# Patient Record
Sex: FEMALE | Race: WHITE | NOT HISPANIC OR LATINO | Employment: FULL TIME | URBAN - NONMETROPOLITAN AREA
[De-identification: names, ages, dates, MRNs, and addresses within clinical notes are randomized per-mention and may not be internally consistent; named-entity substitution may affect disease eponyms.]

---

## 2023-02-03 PROBLEM — E66.2 CLASS 1 OBESITY WITH ALVEOLAR HYPOVENTILATION AND BODY MASS INDEX (BMI) OF 33.0 TO 33.9 IN ADULT (MULTI): Status: ACTIVE | Noted: 2023-02-03

## 2023-02-03 PROBLEM — E66.09 CLASS 1 OBESITY DUE TO EXCESS CALORIES WITH BODY MASS INDEX (BMI) OF 30.0 TO 30.9 IN ADULT: Status: ACTIVE | Noted: 2023-02-03

## 2023-02-03 PROBLEM — N92.0 HEAVY PERIODS: Status: ACTIVE | Noted: 2023-02-03

## 2023-02-03 PROBLEM — N93.9 ABNORMAL UTERINE BLEEDING (AUB): Status: ACTIVE | Noted: 2023-02-03

## 2023-02-03 PROBLEM — E66.811 CLASS 1 OBESITY DUE TO EXCESS CALORIES WITH BODY MASS INDEX (BMI) OF 30.0 TO 30.9 IN ADULT: Status: ACTIVE | Noted: 2023-02-03

## 2023-02-03 PROBLEM — M54.10 BACK PAIN WITH RADICULOPATHY: Status: ACTIVE | Noted: 2023-02-03

## 2023-02-03 PROBLEM — E66.811 CLASS 1 OBESITY WITH ALVEOLAR HYPOVENTILATION AND BODY MASS INDEX (BMI) OF 33.0 TO 33.9 IN ADULT: Status: ACTIVE | Noted: 2023-02-03

## 2023-02-03 PROBLEM — R73.03 PREDIABETES: Status: ACTIVE | Noted: 2023-02-03

## 2023-02-03 RX ORDER — KETOTIFEN FUMARATE 0.35 MG/ML
1 SOLUTION/ DROPS OPHTHALMIC EVERY 12 HOURS PRN
COMMUNITY
Start: 2022-05-06 | End: 2023-07-17 | Stop reason: SDUPTHER

## 2023-02-03 RX ORDER — MOMETASONE FUROATE 50 UG/1
2 SPRAY, METERED NASAL DAILY
COMMUNITY
Start: 2022-04-29

## 2023-02-03 RX ORDER — FLUTICASONE PROPIONATE 50 MCG
1 SPRAY, SUSPENSION (ML) NASAL DAILY
COMMUNITY
Start: 2022-08-01 | End: 2023-03-17 | Stop reason: ALTCHOICE

## 2023-02-03 RX ORDER — CYCLOBENZAPRINE HCL 10 MG
1 TABLET ORAL 3 TIMES DAILY
COMMUNITY
Start: 2022-10-20 | End: 2023-03-17 | Stop reason: ALTCHOICE

## 2023-02-03 RX ORDER — CETIRIZINE HYDROCHLORIDE 10 MG/1
1 TABLET ORAL NIGHTLY
COMMUNITY
Start: 2022-04-29 | End: 2023-07-17 | Stop reason: SDUPTHER

## 2023-03-15 NOTE — PROGRESS NOTES
Subjective   Patient ID: Margarita Ortega is a 42 y.o. female who presents for Follow-up (4 months; numbness/tingling in fingers and arms, typically at night, right middle and ring finger are always tingling; ).  HPI  A 42 year White F presenting for followup:     Chest pain: Still having some occasional chest pain but it is not bothering her.      Back pain: Still with pain in the left side of her lower back off and on, gets it with standing, housework. Not radiating into the legs anymore. Completed xray. She is taking a supplement for arthritis. She is waking up with back pain, pain is worse in the morning.      Carpal tunnel syndrome: Previously was doing well with bracing. She is having paresthesia (pins and needles sensation) in 3rd and 4th digit of right hand. Was getting it intermittently but for the last few months it has been constant but varying in intensity.      Prediabetes: Not on meds. Weight has been stable.      Dizziness: Likely from vertigo. She has made adjustments in her activities and had significant improvement.      Heavy periods: Initially did great after ablation but her most period was so heavy she had to miss work. She saw Dr. Lowery again and she has followup with him scheduled. Unsure if she wants to pursue hysterectomy.      She has 4 boys. One is in college in [place], 3 are 11, 13, 16. Moved here 2y ago.      All other systems have been reviewed and are negative for complaint  Review of Systems    Objective   Physical Exam  Gen: No acute distress. Alert and oriented x3.   HEENT: Normocephalic, atraumatic. PERRLA and EOMI, no conjunctival injection. B/L EAC are clear, TM's viewed are WNL. No rhinorrhea, no oropharyngeal lesions.  Neck: No lymphadenopathy, thyroid WNL.  CV: Regular rate and rhythm. Normal S1/S2.  Resp: CTAB/L. No wheezes or rhonchi appreciated.  Abdomen: Soft. Nontender. Nondistended. Bowel sounds normoactive. No guarding or rigidity.  Derm: Skin is warm and dry. No rashes  appreciated or suspicious lesions noted.   Neuro: Cranial nerves intact. Normal gait.  Psych: Appropriate mood and affect. Normal speech and eye contact.   Extremities: No deformities appreciated. No severe edema.    Assessment/Plan        43yo female here for followup:     #Chest pain:  likely anxiety  improving     #Back pain with radiculopathy  Xrays show DJD  Did ok with medrol pack, muscle relaxer  Declining PT at this point     #Carpal tunnel syndrome:   Previously doing well with nighttime bracing  Referring to ortho hand to discuss carpal tunnel surgery     #Environmental allergies  on cetirizine  she gets nasacort otc  she takes flonase during winter but it doesn't help as much  she also uses eye drops (pataday)     #Prediabetes:  doing well since weight loss  check labs      #Heavy periods:  established with gyn  s/p D&C ablation, initially did well but now having more pain and bleeding  Completed recent ultrasound and has followup with GYN     HCM:  Mammogram Nov was negative  scheduled for pap with Dr. Lowery  Declining flu vaccine

## 2023-03-17 ENCOUNTER — OFFICE VISIT (OUTPATIENT)
Dept: PRIMARY CARE | Facility: CLINIC | Age: 43
End: 2023-03-17
Payer: COMMERCIAL

## 2023-03-17 VITALS
DIASTOLIC BLOOD PRESSURE: 71 MMHG | HEART RATE: 80 BPM | HEIGHT: 65 IN | WEIGHT: 188 LBS | BODY MASS INDEX: 31.32 KG/M2 | SYSTOLIC BLOOD PRESSURE: 114 MMHG

## 2023-03-17 DIAGNOSIS — R73.03 PREDIABETES: Primary | ICD-10-CM

## 2023-03-17 DIAGNOSIS — G56.03 BILATERAL CARPAL TUNNEL SYNDROME: ICD-10-CM

## 2023-03-17 PROCEDURE — 99214 OFFICE O/P EST MOD 30 MIN: CPT | Performed by: FAMILY MEDICINE

## 2023-03-17 PROCEDURE — 1036F TOBACCO NON-USER: CPT | Performed by: FAMILY MEDICINE

## 2023-03-17 RX ORDER — FEXOFENADINE HCL 60 MG
60 TABLET ORAL DAILY
COMMUNITY
End: 2023-12-20 | Stop reason: ALTCHOICE

## 2023-03-17 NOTE — PATIENT INSTRUCTIONS
Orthopedics:   Kirk Agosto () [phone]  [facility] [phone]

## 2023-05-09 LAB — SARS-COV-2 RESULT: NOT DETECTED

## 2023-05-11 ENCOUNTER — HOSPITAL ENCOUNTER (OUTPATIENT)
Dept: DATA CONVERSION | Facility: HOSPITAL | Age: 43
End: 2023-05-12
Attending: OBSTETRICS & GYNECOLOGY | Admitting: OBSTETRICS & GYNECOLOGY
Payer: COMMERCIAL

## 2023-05-11 DIAGNOSIS — E66.9 OBESITY, UNSPECIFIED: ICD-10-CM

## 2023-05-11 DIAGNOSIS — R10.2 PELVIC AND PERINEAL PAIN: ICD-10-CM

## 2023-05-11 DIAGNOSIS — N93.9 ABNORMAL UTERINE AND VAGINAL BLEEDING, UNSPECIFIED: ICD-10-CM

## 2023-05-11 DIAGNOSIS — N80.03 ADENOMYOSIS OF THE UTERUS: ICD-10-CM

## 2023-05-11 DIAGNOSIS — N72 INFLAMMATORY DISEASE OF CERVIX UTERI: ICD-10-CM

## 2023-05-11 DIAGNOSIS — D25.9 LEIOMYOMA OF UTERUS, UNSPECIFIED: ICD-10-CM

## 2023-05-11 DIAGNOSIS — R73.03 PREDIABETES: ICD-10-CM

## 2023-05-11 LAB
ANION GAP IN SER/PLAS: 14 MMOL/L (ref 10–20)
CALCIUM (MG/DL) IN SER/PLAS: 8.3 MG/DL (ref 8.6–10.3)
CARBON DIOXIDE, TOTAL (MMOL/L) IN SER/PLAS: 20 MMOL/L (ref 21–32)
CHLORIDE (MMOL/L) IN SER/PLAS: 106 MMOL/L (ref 98–107)
CREATININE (MG/DL) IN SER/PLAS: 0.5 MG/DL (ref 0.5–1.05)
ERYTHROCYTE DISTRIBUTION WIDTH (RATIO) BY AUTOMATED COUNT: 12.4 % (ref 11.5–14.5)
ERYTHROCYTE MEAN CORPUSCULAR HEMOGLOBIN CONCENTRATION (G/DL) BY AUTOMATED: 32.4 G/DL (ref 32–36)
ERYTHROCYTE MEAN CORPUSCULAR VOLUME (FL) BY AUTOMATED COUNT: 91 FL (ref 80–100)
ERYTHROCYTES (10*6/UL) IN BLOOD BY AUTOMATED COUNT: 4.11 X10E12/L (ref 4–5.2)
GFR FEMALE: >90 ML/MIN/1.73M2
GLUCOSE (MG/DL) IN SER/PLAS: 121 MG/DL (ref 74–99)
HCG, URINE: NEGATIVE
HEMATOCRIT (%) IN BLOOD BY AUTOMATED COUNT: 37.4 % (ref 36–46)
HEMOGLOBIN (G/DL) IN BLOOD: 12.1 G/DL (ref 12–16)
LEUKOCYTES (10*3/UL) IN BLOOD BY AUTOMATED COUNT: 9.6 X10E9/L (ref 4.4–11.3)
PLATELETS (10*3/UL) IN BLOOD AUTOMATED COUNT: 119 X10E9/L (ref 150–450)
POTASSIUM (MMOL/L) IN SER/PLAS: 3.8 MMOL/L (ref 3.5–5.3)
SODIUM (MMOL/L) IN SER/PLAS: 136 MMOL/L (ref 136–145)
UREA NITROGEN (MG/DL) IN SER/PLAS: 17 MG/DL (ref 6–23)

## 2023-06-12 LAB
COMPLETE PATHOLOGY REPORT: NORMAL
CONVERTED CLINICAL DIAGNOSIS-HISTORY: NORMAL
CONVERTED FINAL DIAGNOSIS: NORMAL
CONVERTED FINAL REPORT PDF LINK TO COPY AND PASTE: NORMAL
CONVERTED GROSS DESCRIPTION: NORMAL

## 2023-07-17 DIAGNOSIS — R09.82 PND (POST-NASAL DRIP): ICD-10-CM

## 2023-07-17 DIAGNOSIS — H57.9 ITCHY, WATERY, AND RED EYE: ICD-10-CM

## 2023-07-17 PROBLEM — D25.9 UTERINE FIBROID: Status: ACTIVE | Noted: 2023-07-17

## 2023-07-17 PROBLEM — R10.2 PELVIC PAIN IN FEMALE: Status: ACTIVE | Noted: 2023-07-17

## 2023-07-17 RX ORDER — FLUTICASONE PROPIONATE 50 MCG
1 SPRAY, SUSPENSION (ML) NASAL DAILY
Qty: 16 G | Refills: 0 | Status: SHIPPED | OUTPATIENT
Start: 2023-07-17 | End: 2023-07-17 | Stop reason: SDUPTHER

## 2023-07-17 RX ORDER — CETIRIZINE HYDROCHLORIDE 10 MG/1
10 TABLET ORAL NIGHTLY
Qty: 90 TABLET | Refills: 0 | Status: SHIPPED | OUTPATIENT
Start: 2023-07-17 | End: 2023-07-17 | Stop reason: SDUPTHER

## 2023-07-17 RX ORDER — FLUTICASONE PROPIONATE 50 MCG
1 SPRAY, SUSPENSION (ML) NASAL DAILY
COMMUNITY
Start: 2022-08-01 | End: 2023-07-17 | Stop reason: SDUPTHER

## 2023-07-17 RX ORDER — KETOTIFEN FUMARATE 0.35 MG/ML
1 SOLUTION/ DROPS OPHTHALMIC EVERY 12 HOURS PRN
Qty: 4 ML | Refills: 0 | Status: SHIPPED | OUTPATIENT
Start: 2023-07-17 | End: 2023-08-29

## 2023-07-17 RX ORDER — FLUTICASONE PROPIONATE 50 MCG
1 SPRAY, SUSPENSION (ML) NASAL DAILY
Qty: 16 G | Refills: 0 | Status: SHIPPED | OUTPATIENT
Start: 2023-07-17 | End: 2023-09-29 | Stop reason: ALTCHOICE

## 2023-07-17 RX ORDER — CETIRIZINE HYDROCHLORIDE 10 MG/1
10 TABLET ORAL NIGHTLY
Qty: 90 TABLET | Refills: 0 | Status: SHIPPED | OUTPATIENT
Start: 2023-07-17

## 2023-08-29 DIAGNOSIS — H57.9 ITCHY, WATERY, AND RED EYE: ICD-10-CM

## 2023-08-29 RX ORDER — MINOXIDIL 50 MG/G
AEROSOL, FOAM TOPICAL
Qty: 5 ML | Refills: 0 | Status: SHIPPED | OUTPATIENT
Start: 2023-08-29 | End: 2024-05-30 | Stop reason: SDUPTHER

## 2023-09-07 VITALS — HEIGHT: 65 IN | WEIGHT: 187.39 LBS | BODY MASS INDEX: 31.22 KG/M2

## 2023-09-14 NOTE — H&P
"    History & Physical Reviewed:   Pregnant/Lactating:  ·  Are You Pregnant no (1)   ·  Are You Currently Breastfeeding no (1)     I have reviewed the History and Physical dated:  05-May-2023   History and Physical reviewed and relevant findings noted. Patient examined to review pertinent physical  findings.: No significant changes   Home Medications Reviewed: no changes noted   Allergies Reviewed: no changes noted       ERAS (Enhanced Recovery After Surgery):  ·  ERAS Patient: yes   ·  CPM/PAT Utilization: yes   ·  Immunonutrition Recovery Drink Utilization: no   ·  Carbohydrate Supplement Drink Utilization: no     Consent:   COVID-19 Consent:  ·  COVID-19 Risk Consent Surgeon has reviewed key risks related to the risk of elissa COVID-19 and if they contract COVID-19 what the risks are.       Electronic Signatures:  Claudio Lowery)  (Signed 11-May-2023 08:30)   Authored: History & Physical Reviewed, ERAS, Consent,  Note Completion      Last Updated: 11-May-2023 08:30 by Claudio Lowery)    References:  1.  Data Referenced From \"Patient Profile - Preop v3\" 11-May-2023 07:23   "

## 2023-09-14 NOTE — DISCHARGE SUMMARY
Send Summary:   Discharge Summary Providers:  Provider Role Provider Name   · Attending Claudio Lowery   · Referring Claudio Lowery   · Primary Nicole Sanders       Note Recipients: Claudio Lowery MD Kiefer, Samantha,  - [phone] [Preferred]       Discharge:    Summary:   Admission Date: .11-May-2023 07:01:00   Discharge Date: 12-May-2023   Attending Physician at Discharge: Claudio Lowery   Admission Reason: Fibroid uterus, abnormal uterine  bleeding, pain in female pelvis   Final Discharge Diagnoses: LAVH   Procedures: Date: 11-May-2023 10:30:00  Procedure Name: 1.  Laparoscopic-assisted vaginal hysterectomy, cystoscopy  2.   3.   4.   5.   Condition at Discharge: Satisfactory   Disposition at Discharge: .Home   Vital Signs:        T   P  R  BP   MAP  SpO2   Value  36.5  72  19  114/73      97%  Date/Time 5/12 10:55 5/12 10:55 5/12 10:55 5/12 10:55    5/12 10:55  Range  (34.9C - 36.6C )  (71 - 89 )  (18 - 19 )  (113 - 131 )/ (58 - 82 )    (97% - 98% )    Date:            Weight/Scale Type:  Height:   11-May-2023 12:39  85  kg         165.1  cm    Physical Exam:    PHYSICAL EXAM:  General:  NAD, well-nourished, well-developed  HEENT:  NC/AT, MMM, PERRLA, EOMI  Pulmonary: CTA bilaterally, neck supple, no LAD  Cardiovascular:  RRR, no M/R/G  Abdomen: soft, NT/ND, +BSx4  Extremities: no C/C/E, PPPx4, DAMION x4  Neurological: CN II-XII intact, gait WNL, no focal abnormalities  Skin: dry and warm, no rashes      Hospital Course:    42 year old female s/p LAVH and Cystoscopy on 5-11-23 with Dr. Lowery.  The patient tolerated the procedure well and there were no complications.  We managed her surgical  pain with acetaminophen, IV ketorolac and oxycodone and we encouraged her to ambulate frequently on the nursing unit.  The patient was tolerating a regular diet and ambulating without difficulty therefore she was discharged home with instructions to follow-up  with Dr. Lowery in six weeks.         Discharge  Information:    and Continuing Care:   Lab Results - Pending:    Surgical Pathology Drawn at 11-May-2023 10:10:00  Radiology Results - Pending: None   Discharge Instructions:    Activity:           activity as tolerated.          May shower..            May not return to school/work until follow-up visit with.  Dr. Lowery            May drive..  Beginning in 5-7 days          No pushing, pulling, or lifting objects greater than 10 pounds for 2 week(s).            Walk intermittently throughout the day.          Return to normal activity          No pushing, pulling or lifting objects greater than 10 pounds for 2 weeks    Nutrition/Diet:           regular    Follow Up Appointments:    Follow-Up - GYN Provider:           Physician/Dept/Service:   GYN Provider   Dr. Lowery          Call to Schedule in:   6 weeks          Phone Number:   [phone]    Discharge Medications: Home Medication   cetirizine 10 mg oral tablet - 1 tab(s) orally once a day  docusate sodium 100 mg oral tablet - 2 tab(s) orally once a day  ketotifen ophthalmic - 1 drop(s) to each affected eye 2 times a day  multivitamin - orally once a day  triamcinolone 55 mcg/inh nasal spray - 1 spray(s) nasal once a day  Glucosamine Chondroitin oral capsule - orally once a day     PRN Medication   acetaminophen 500 mg oral tablet - 2 tab(s) orally every 12 hours, As Needed  ibuprofen 200 mg oral tablet - 2 tab(s) orally every 6 hours, As Needed  oxycodone-acetaminophen 5 mg-325 mg oral tablet - 1 tab(s) orally every 6 hours, As Needed   Issues to Discuss at Follow-up / Goals for Continuing Care:    pain  activity        DNR Status:   ·  Code Status Code Status order at time of discharge: Full Code       Electronic Signatures:  Holli Acevedo (PA (PHYSICIAN))  (Signed 12-May-2023 12:50)   Authored: Send Summary, Summary Content, Ongoing Care,  DNR Status, Note Completion      Last Updated: 12-May-2023 12:50 by Holli Acevedo (PA (PHYSICIAN))

## 2023-09-20 NOTE — PROGRESS NOTES
Subjective   Patient ID: Margarita Ortega is a 42 y.o. female who presents for Follow-up (6 months).  HPI     Chest pain: Still having some occasional chest pain. Her son passed away from a drowning this summer. Not interested in med currently, she is scheduled to begin counselling for this soon.      Back pain: Still with pain in the left side of her lower back off and on, gets it with standing, housework. Not radiating into the legs anymore. Completed xray. She is taking a supplement for arthritis. She is waking up with back pain, pain is worse in the morning.      Carpal tunnel syndrome: Previously was doing well with bracing. She is having paresthesia (pins and needles sensation) in 3rd and 4th digit of right hand. Was getting it intermittently but for the last few months it has been constant but varying in intensity.      Prediabetes: Not on meds. Weight has been stable.      Dizziness: Likely from vertigo. She has made adjustments in her activities and had significant improvement.      Heavy periods: s/p hysterectomy in May     She has 4 boys. One is in college in [place], 3 are 11, 13, 16. Moved here 2y ago.      All other systems have been reviewed and are negative for complaint  Review of Systems    Objective   Physical Exam  Gen: No acute distress. Alert and oriented x3.   HEENT: Normocephalic, atraumatic. PERRLA and EOMI, no conjunctival injection. B/L EAC are clear, TM's viewed are WNL. No rhinorrhea, no oropharyngeal lesions.  Neck: No lymphadenopathy, thyroid WNL.  CV: Regular rate and rhythm. Normal S1/S2.  Resp: CTAB/L. No wheezes or rhonchi appreciated.  Abdomen: Soft. Nontender. Nondistended. Bowel sounds normoactive. No guarding or rigidity.  Derm: Skin is warm and dry. No rashes appreciated or suspicious lesions noted.   Neuro: Cranial nerves intact. Normal gait.  Psych: Appropriate mood and affect. Normal speech and eye contact.   Extremities: No deformities appreciated. No severe  edema.    Assessment/Plan        41yo female here for followup:     #Grief  Declining meds  Planning for counselling    #Chest pain:  likely anxiety  Declining meds for now     #Back pain with radiculopathy  Xrays show DJD  Did ok with medrol pack, muscle relaxer  Declining PT at this point     #Carpal tunnel syndrome:   Previously doing well with nighttime bracing  Previously referred to ortho hand to discuss carpal tunnel surgery  Holding off on eval now while symptoms are better     #Environmental allergies  on cetirizine  she gets nasacort otc  she takes flonase during winter but it doesn't help as much  she also uses eye drops (pataday)     #Prediabetes:  doing well since weight loss  check labs      #Heavy periods:  S/p laparoscopic assisted vaginal hysterectomy in May     HCM:  Mammogram Nov was negative  Declining flu vaccine

## 2023-09-25 ENCOUNTER — LAB (OUTPATIENT)
Dept: LAB | Facility: LAB | Age: 43
End: 2023-09-25
Payer: COMMERCIAL

## 2023-09-25 DIAGNOSIS — R73.03 PREDIABETES: ICD-10-CM

## 2023-09-25 LAB
ALANINE AMINOTRANSFERASE (SGPT) (U/L) IN SER/PLAS: 14 U/L (ref 7–45)
ALBUMIN (G/DL) IN SER/PLAS: 4.6 G/DL (ref 3.4–5)
ALKALINE PHOSPHATASE (U/L) IN SER/PLAS: 47 U/L (ref 33–110)
ANION GAP IN SER/PLAS: 12 MMOL/L (ref 10–20)
ASPARTATE AMINOTRANSFERASE (SGOT) (U/L) IN SER/PLAS: 14 U/L (ref 9–39)
BASOPHILS (10*3/UL) IN BLOOD BY AUTOMATED COUNT: 0.03 X10E9/L (ref 0–0.1)
BASOPHILS/100 LEUKOCYTES IN BLOOD BY AUTOMATED COUNT: 0.4 % (ref 0–2)
BILIRUBIN TOTAL (MG/DL) IN SER/PLAS: 0.5 MG/DL (ref 0–1.2)
CALCIUM (MG/DL) IN SER/PLAS: 9.1 MG/DL (ref 8.6–10.3)
CARBON DIOXIDE, TOTAL (MMOL/L) IN SER/PLAS: 27 MMOL/L (ref 21–32)
CHLORIDE (MMOL/L) IN SER/PLAS: 104 MMOL/L (ref 98–107)
CHOLESTEROL (MG/DL) IN SER/PLAS: 159 MG/DL (ref 0–199)
CHOLESTEROL IN HDL (MG/DL) IN SER/PLAS: 40.1 MG/DL
CHOLESTEROL/HDL RATIO: 4
CREATININE (MG/DL) IN SER/PLAS: 0.57 MG/DL (ref 0.5–1.05)
EOSINOPHILS (10*3/UL) IN BLOOD BY AUTOMATED COUNT: 0.01 X10E9/L (ref 0–0.7)
EOSINOPHILS/100 LEUKOCYTES IN BLOOD BY AUTOMATED COUNT: 0.1 % (ref 0–6)
ERYTHROCYTE DISTRIBUTION WIDTH (RATIO) BY AUTOMATED COUNT: 12.8 % (ref 11.5–14.5)
ERYTHROCYTE MEAN CORPUSCULAR HEMOGLOBIN CONCENTRATION (G/DL) BY AUTOMATED: 33.7 G/DL (ref 32–36)
ERYTHROCYTE MEAN CORPUSCULAR VOLUME (FL) BY AUTOMATED COUNT: 86 FL (ref 80–100)
ERYTHROCYTES (10*6/UL) IN BLOOD BY AUTOMATED COUNT: 4.68 X10E12/L (ref 4–5.2)
GFR FEMALE: >90 ML/MIN/1.73M2
GLUCOSE (MG/DL) IN SER/PLAS: 95 MG/DL (ref 74–99)
HEMATOCRIT (%) IN BLOOD BY AUTOMATED COUNT: 40.1 % (ref 36–46)
HEMOGLOBIN (G/DL) IN BLOOD: 13.5 G/DL (ref 12–16)
IMMATURE GRANULOCYTES/100 LEUKOCYTES IN BLOOD BY AUTOMATED COUNT: 0.3 % (ref 0–0.9)
LDL: 89 MG/DL (ref 0–99)
LEUKOCYTES (10*3/UL) IN BLOOD BY AUTOMATED COUNT: 6.7 X10E9/L (ref 4.4–11.3)
LYMPHOCYTES (10*3/UL) IN BLOOD BY AUTOMATED COUNT: 1.66 X10E9/L (ref 1.2–4.8)
LYMPHOCYTES/100 LEUKOCYTES IN BLOOD BY AUTOMATED COUNT: 24.8 % (ref 13–44)
MONOCYTES (10*3/UL) IN BLOOD BY AUTOMATED COUNT: 0.42 X10E9/L (ref 0.1–1)
MONOCYTES/100 LEUKOCYTES IN BLOOD BY AUTOMATED COUNT: 6.3 % (ref 2–10)
NEUTROPHILS (10*3/UL) IN BLOOD BY AUTOMATED COUNT: 4.55 X10E9/L (ref 1.2–7.7)
NEUTROPHILS/100 LEUKOCYTES IN BLOOD BY AUTOMATED COUNT: 68.1 % (ref 40–80)
PLATELETS (10*3/UL) IN BLOOD AUTOMATED COUNT: 174 X10E9/L (ref 150–450)
POTASSIUM (MMOL/L) IN SER/PLAS: 4 MMOL/L (ref 3.5–5.3)
PROTEIN TOTAL: 7.2 G/DL (ref 6.4–8.2)
SODIUM (MMOL/L) IN SER/PLAS: 139 MMOL/L (ref 136–145)
TRIGLYCERIDE (MG/DL) IN SER/PLAS: 151 MG/DL (ref 0–149)
UREA NITROGEN (MG/DL) IN SER/PLAS: 15 MG/DL (ref 6–23)
VLDL: 30 MG/DL (ref 0–40)

## 2023-09-25 PROCEDURE — 80061 LIPID PANEL: CPT

## 2023-09-25 PROCEDURE — 83036 HEMOGLOBIN GLYCOSYLATED A1C: CPT

## 2023-09-25 PROCEDURE — 85025 COMPLETE CBC W/AUTO DIFF WBC: CPT

## 2023-09-25 PROCEDURE — 80053 COMPREHEN METABOLIC PANEL: CPT

## 2023-09-25 PROCEDURE — 36415 COLL VENOUS BLD VENIPUNCTURE: CPT

## 2023-09-26 LAB
ESTIMATED AVERAGE GLUCOSE FOR HBA1C: 97 MG/DL
HEMOGLOBIN A1C/HEMOGLOBIN TOTAL IN BLOOD: 5 %

## 2023-09-29 ENCOUNTER — OFFICE VISIT (OUTPATIENT)
Dept: PRIMARY CARE | Facility: CLINIC | Age: 43
End: 2023-09-29
Payer: COMMERCIAL

## 2023-09-29 VITALS
SYSTOLIC BLOOD PRESSURE: 103 MMHG | HEART RATE: 74 BPM | BODY MASS INDEX: 33.99 KG/M2 | HEIGHT: 65 IN | WEIGHT: 204 LBS | DIASTOLIC BLOOD PRESSURE: 67 MMHG

## 2023-09-29 DIAGNOSIS — Z12.31 ENCOUNTER FOR SCREENING MAMMOGRAM FOR MALIGNANT NEOPLASM OF BREAST: Primary | ICD-10-CM

## 2023-09-29 PROCEDURE — 99214 OFFICE O/P EST MOD 30 MIN: CPT | Performed by: FAMILY MEDICINE

## 2023-09-29 PROCEDURE — 3008F BODY MASS INDEX DOCD: CPT | Performed by: FAMILY MEDICINE

## 2023-09-29 PROCEDURE — 1036F TOBACCO NON-USER: CPT | Performed by: FAMILY MEDICINE

## 2023-10-02 NOTE — OP NOTE
Post Operative Note:     Post-Procedure Diagnosis: Fibroid uterus, abnormal  uterine bleeding, pain in female pelvis   Procedure: 1.  Laparoscopic-assisted vaginal hysterectomy,  cystoscopy  2.   3.   4.   5.   Surgeon: Sebastián   Resident/Fellow/Other Assistant: Susan   Estimated Blood Loss (mL): 250 cc   Specimen: yes. Uterus and cervix   Findings: Fibroid uterus, prior bilateral salpingectomy     Operative Report Dictated:  Dictation: not applicable - note contains Operative  Report   Note Recipients: Claudio Lowery MD Kiefer, Samantha,  - [phone] [Preferred]   Operative Report:    May 11, 2023 [facility] [place] preop diagnosis was fibroid uterus abnormal uterine bleeding pelvic pain postop diagnosis same procedures laparoscopic assisted  vaginal hysterectomy, cystoscopy    Surgeon myself, general anesthetic    General anesthetic lithotomy position patient prepped draped usual manner.  Received preoperative antibiotics and thromboprophylaxis.  In-N-Out catheterization done of the bladder.  Single-tooth tenaculum used to grasp the antilipid the cervix.  Dubois  cannula inserted into the cervical os    She has a periumbilical hernia with start in the left upper quadrant after her stomach was emptied.  Veress needle inserted 2 fingerbreadths below the costal margin in the midclavicular line.  Peritoneal insufflation.  Laparoscope inserted and there was  omental adhesions in the midline.  Additional 5 mm port placed left lower quadrant using the LigaSure device the omental adhesions were released.  Trocars then placed infraumbilically and suprapubically.  Fibroid uterus.  Evidence of prior bilateral salpingectomy.   Ovaries normal.  Ureters visualized using the LigaSure device and the round ligaments and utero-ovarian pedicles were grasped cauterized and cut.  At the upper pedicles of the uterus.    Patient repositioned in candycane stirrups.  Weighted speculum placed posteriorly Deavers anteriorly and  laterally.  2 single-tooth tenaculum scratched the cervix.  Circumferential incision made around the cervical vaginal mucosa anterior posterior cul-de-sacs  entered without difficulty.  Uterosacral and cardinal ligaments clamped incised ligated uterine vessels clamped incised ligated fundus inverted remaining parametrium clamped incised ligated allowing us to remove the uterus and cervix.    Angles of the vault then reapproximated with interrupted 2-0 Vicryl suture incorporating the uterosacral and cardinal pedicles.  Posterior cuff closed with interrupted 2-0 Vicryl sutures.    All sponges removed sponge and instrument counts correct.  Hemostasis satisfactory and in vaginal vault closed anterior to posterior with interrupted 2-0 Vicryl suture.    Hysteroscopy performed.  Patient had received methylene blue intravenously.  Blue-tinged urine flowing from both ureteric orifices.  No evidence of bladder injury.  Cystoscope removed and Young catheter inserted into the bladder.  We then revisited the  laparoscopic view.  Peritoneal insufflation.  The pelvis irrigated and suction.  All amount of bleeding by the left uterosacral ligament which was controlled using the LigaSure device.  Pelvis then copiously irrigated and suction hemostasis excellent.   Floseal placed along the pelvic floor suture line.  Peritoneal ex inflation.  All instruments removed.  Skin incisions reapproximated.    Blood loss 250 cc.  Counts correct.  Transferred to recovery room in stable condition    Attestation:   Note Completion:  Attending Attestation I performed the procedure without a resident         Electronic Signatures:  Claudio Lowery)  (Signed 11-May-2023 10:34)   Authored: Post Operative Note, Note Completion      Last Updated: 11-May-2023 10:34 by Claudio Lowery)

## 2023-10-09 ENCOUNTER — TELEPHONE (OUTPATIENT)
Dept: PRIMARY CARE | Facility: CLINIC | Age: 43
End: 2023-10-09
Payer: COMMERCIAL

## 2023-10-09 DIAGNOSIS — F43.21 GRIEF AT LOSS OF CHILD: Primary | ICD-10-CM

## 2023-10-09 DIAGNOSIS — Z63.4 GRIEF AT LOSS OF CHILD: Primary | ICD-10-CM

## 2023-10-09 RX ORDER — FLUOXETINE HYDROCHLORIDE 20 MG/1
20 CAPSULE ORAL DAILY
Qty: 30 CAPSULE | Refills: 5 | Status: SHIPPED | OUTPATIENT
Start: 2023-10-09 | End: 2024-03-25

## 2023-10-09 SDOH — SOCIAL STABILITY - SOCIAL INSECURITY: DISSAPEARANCE AND DEATH OF FAMILY MEMBER: Z63.4

## 2023-11-15 ENCOUNTER — APPOINTMENT (OUTPATIENT)
Dept: OBSTETRICS AND GYNECOLOGY | Facility: CLINIC | Age: 43
End: 2023-11-15

## 2023-12-13 ENCOUNTER — OFFICE VISIT (OUTPATIENT)
Dept: PRIMARY CARE | Facility: CLINIC | Age: 43
End: 2023-12-13
Payer: COMMERCIAL

## 2023-12-13 VITALS
HEART RATE: 75 BPM | BODY MASS INDEX: 35.32 KG/M2 | WEIGHT: 212 LBS | DIASTOLIC BLOOD PRESSURE: 77 MMHG | SYSTOLIC BLOOD PRESSURE: 114 MMHG | HEIGHT: 65 IN

## 2023-12-13 DIAGNOSIS — F51.01 PRIMARY INSOMNIA: Primary | ICD-10-CM

## 2023-12-13 PROBLEM — E66.811 CLASS 1 OBESITY DUE TO EXCESS CALORIES WITH BODY MASS INDEX (BMI) OF 30.0 TO 30.9 IN ADULT: Status: RESOLVED | Noted: 2023-02-03 | Resolved: 2023-12-13

## 2023-12-13 PROBLEM — R10.2 PELVIC PAIN IN FEMALE: Status: RESOLVED | Noted: 2023-07-17 | Resolved: 2023-12-13

## 2023-12-13 PROBLEM — E66.09 CLASS 1 OBESITY DUE TO EXCESS CALORIES WITH BODY MASS INDEX (BMI) OF 30.0 TO 30.9 IN ADULT: Status: RESOLVED | Noted: 2023-02-03 | Resolved: 2023-12-13

## 2023-12-13 PROBLEM — N93.9 ABNORMAL UTERINE BLEEDING (AUB): Status: RESOLVED | Noted: 2023-02-03 | Resolved: 2023-12-13

## 2023-12-13 PROCEDURE — 1036F TOBACCO NON-USER: CPT | Performed by: FAMILY MEDICINE

## 2023-12-13 PROCEDURE — 3008F BODY MASS INDEX DOCD: CPT | Performed by: FAMILY MEDICINE

## 2023-12-13 PROCEDURE — 99214 OFFICE O/P EST MOD 30 MIN: CPT | Performed by: FAMILY MEDICINE

## 2023-12-13 RX ORDER — TRAZODONE HYDROCHLORIDE 50 MG/1
50 TABLET ORAL NIGHTLY PRN
Qty: 30 TABLET | Refills: 5 | Status: SHIPPED | OUTPATIENT
Start: 2023-12-13 | End: 2024-01-26 | Stop reason: ALTCHOICE

## 2023-12-13 NOTE — PROGRESS NOTES
Margarita Ortega is a 43 y.o. female who presents for Sick Visit (Trouble staying asleep. She falls asleep without issue but is waking up roughly every hour. She's taking melatonin which helps her stay asleep for a few hours but after it wears off she's up multiple times throughout the night. Started shortly after starting zoloft so she switched from taking it at night to AM and it hasn't changed her sleeping pattern)    Insomnia: She is taking prozac, no SE's. Feels it is helping but she is having issues with sleeping. She was initially taking the fluoxetine at night but then she switched it to the daytime but it didn't seem to improve her insomnia symptoms. Feels it has gotten worse. She is having a hard time staying asleep but not falling asleep. She is taking melatonin, it helps her sleep for about 3 hours and then she will be up every hour for the next 8 hours. She is having a lot of physical restlessness, her partner states that she is continually twitching when she is not getting deep sleep so in general the quality of the sleep has been bad. Very tired during the day. She has some history of insomnia, she has tried ambien in the distant past but doesn't recall the circumstances.     Chest pain: Still having some occasional chest pain. Her son passed away from a drowning this summer. Not interested in med currently, she is scheduled to begin counselling for this soon.      Back pain: Still with pain in the left side of her lower back off and on, gets it with standing, housework. Not radiating into the legs anymore. Completed xray. She is taking a supplement for arthritis. She is waking up with back pain, pain is worse in the morning.      Carpal tunnel syndrome: Previously was doing well with bracing. She is having paresthesia (pins and needles sensation) in 3rd and 4th digit of right hand. Was getting it intermittently but for the last few months it has been constant but varying in intensity.      Prediabetes:  "Not on meds. Weight has been stable.      Dizziness: Likely from vertigo. She has made adjustments in her activities and had significant improvement.      Heavy periods: s/p hysterectomy in May     She has 4 boys. One is in college in [place], 3 are 11, 13, 16. Moved here 2y ago.      All other systems have been reviewed and are negative for complaint    Objective   /77 (BP Location: Left arm, Patient Position: Sitting, BP Cuff Size: Large adult)   Pulse 75   Ht 1.651 m (5' 5\")   Wt 96.2 kg (212 lb)   BMI 35.28 kg/m²     Gen: No acute distress, alert and oriented x3, pleasant   HEENT: moist mucous membranes, b/l external auditory canals are clear of debris, TMs within normal limits, no oropharyngeal lesions, eomi, perrla   Neck: thyroid within normal limits, no lymphadenopathy   CV: RRR, normal S1/S2, no murmur   Resp: Clear to auscultation bilaterally, no wheezes or rhonchi appreciated  Abd: soft, nontender, non-distended, no guarding/rigidity, bowel sounds present  Extr: no edema, no calf tenderness  Derm: Skin is warm and dry, no rashes appreciated  Psych: mood is good, affect is congruent, good hygiene, normal speech and eye contact  Neuro: cranial nerves grossly intact, normal gait    Assessment/Plan     #Insomnia  Trial trazodone  If no improvement check labs  Followup 6 weeks  May need to discuss sleep study    #Grief  Doing reasonably well on fluoxetine     #Chest pain:  likely anxiety  Declining meds for now     #Back pain with radiculopathy  Xrays show DJD  Did ok with medrol pack, muscle relaxer  Declining PT at this point     #Carpal tunnel syndrome:   Previously doing well with nighttime bracing  Previously referred to ortho hand to discuss carpal tunnel surgery  Holding off on eval now while symptoms are better     #Environmental allergies  on cetirizine  she gets nasacort otc  she takes flonase during winter but it doesn't help as much  she also uses eye drops (pataday)   "   #Prediabetes:  doing well since weight loss  check labs      #Heavy periods:  S/p laparoscopic assisted vaginal hysterectomy in May     HCM:  Mammogram Nov was negative  Declining flu vaccine

## 2023-12-20 ENCOUNTER — OFFICE VISIT (OUTPATIENT)
Dept: OBSTETRICS AND GYNECOLOGY | Facility: CLINIC | Age: 43
End: 2023-12-20
Payer: COMMERCIAL

## 2023-12-20 ENCOUNTER — HOSPITAL ENCOUNTER (OUTPATIENT)
Dept: RADIOLOGY | Facility: HOSPITAL | Age: 43
Discharge: HOME | End: 2023-12-20
Payer: COMMERCIAL

## 2023-12-20 VITALS
HEIGHT: 65 IN | SYSTOLIC BLOOD PRESSURE: 124 MMHG | DIASTOLIC BLOOD PRESSURE: 74 MMHG | BODY MASS INDEX: 26.99 KG/M2 | WEIGHT: 162 LBS

## 2023-12-20 DIAGNOSIS — Z01.419 WELL WOMAN EXAM: Primary | ICD-10-CM

## 2023-12-20 DIAGNOSIS — Z12.31 ENCOUNTER FOR SCREENING MAMMOGRAM FOR MALIGNANT NEOPLASM OF BREAST: ICD-10-CM

## 2023-12-20 PROCEDURE — 3008F BODY MASS INDEX DOCD: CPT | Performed by: MIDWIFE

## 2023-12-20 PROCEDURE — 77063 BREAST TOMOSYNTHESIS BI: CPT

## 2023-12-20 PROCEDURE — 77067 SCR MAMMO BI INCL CAD: CPT | Mod: BILATERAL PROCEDURE | Performed by: STUDENT IN AN ORGANIZED HEALTH CARE EDUCATION/TRAINING PROGRAM

## 2023-12-20 PROCEDURE — 77067 SCR MAMMO BI INCL CAD: CPT

## 2023-12-20 PROCEDURE — 99396 PREV VISIT EST AGE 40-64: CPT | Performed by: MIDWIFE

## 2023-12-20 PROCEDURE — 1036F TOBACCO NON-USER: CPT | Performed by: MIDWIFE

## 2023-12-20 PROCEDURE — 77063 BREAST TOMOSYNTHESIS BI: CPT | Mod: BILATERAL PROCEDURE | Performed by: STUDENT IN AN ORGANIZED HEALTH CARE EDUCATION/TRAINING PROGRAM

## 2023-12-20 NOTE — PROGRESS NOTES
Subjective   Patient ID: Margarita Ortega is a 43 y.o. female who presents for well woman visit.  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        Objective   Physical Exam  Vitals and nursing note reviewed.   Constitutional:       Appearance: Normal appearance.   HENT:      Nose: Nose normal.   Eyes:      Pupils: Pupils are equal, round, and reactive to light.   Neck:      Thyroid: No thyroid mass.   Cardiovascular:      Rate and Rhythm: Normal rate and regular rhythm.   Pulmonary:      Effort: Pulmonary effort is normal.      Breath sounds: Normal breath sounds.   Chest:      Chest wall: No mass.   Breasts:     Right: Normal.      Left: Normal.   Abdominal:      Palpations: Abdomen is soft. There is no mass.      Tenderness: There is no abdominal tenderness.   Genitourinary:     General: Normal vulva.      Labia:         Right: No rash, tenderness or lesion.         Left: No rash, tenderness or lesion.       Vagina: Normal.      Uterus: Absent.       Adnexa: Right adnexa normal and left adnexa normal.   Musculoskeletal:         General: Normal range of motion.   Lymphadenopathy:      Cervical: No cervical adenopathy.      Lower Body: No right inguinal adenopathy. No left inguinal adenopathy.   Skin:     General: Skin is warm and dry.   Neurological:      Mental Status: She is alert and oriented to person, place, and time.      Motor: Motor function is intact.      Gait: Gait is intact.   Psychiatric:         Mood and Affect: Mood normal.         Assessment/Plan   Diagnoses and all orders for this visit:  Well woman  exam    Reassurance given re exam findings  RTO  AE/PRN       Vaishali Wakefield, ALBERT-FLORESITA, ND 12/20/23 3:14 PM

## 2023-12-29 ENCOUNTER — LAB (OUTPATIENT)
Dept: LAB | Facility: LAB | Age: 43
End: 2023-12-29
Payer: COMMERCIAL

## 2023-12-29 ENCOUNTER — TELEPHONE (OUTPATIENT)
Dept: PRIMARY CARE | Facility: CLINIC | Age: 43
End: 2023-12-29

## 2023-12-29 DIAGNOSIS — F51.01 PRIMARY INSOMNIA: Primary | ICD-10-CM

## 2023-12-29 DIAGNOSIS — F51.01 PRIMARY INSOMNIA: ICD-10-CM

## 2023-12-29 LAB
ALBUMIN SERPL BCP-MCNC: 4.6 G/DL (ref 3.4–5)
ALP SERPL-CCNC: 55 U/L (ref 33–110)
ALT SERPL W P-5'-P-CCNC: 19 U/L (ref 7–45)
ANION GAP SERPL CALC-SCNC: 12 MMOL/L (ref 10–20)
AST SERPL W P-5'-P-CCNC: 17 U/L (ref 9–39)
BASOPHILS # BLD AUTO: 0.03 X10*3/UL (ref 0–0.1)
BASOPHILS NFR BLD AUTO: 0.5 %
BILIRUB SERPL-MCNC: 0.5 MG/DL (ref 0–1.2)
BUN SERPL-MCNC: 14 MG/DL (ref 6–23)
CALCIUM SERPL-MCNC: 9.4 MG/DL (ref 8.6–10.3)
CHLORIDE SERPL-SCNC: 104 MMOL/L (ref 98–107)
CO2 SERPL-SCNC: 26 MMOL/L (ref 21–32)
CREAT SERPL-MCNC: 0.48 MG/DL (ref 0.5–1.05)
EOSINOPHIL # BLD AUTO: 0.01 X10*3/UL (ref 0–0.7)
EOSINOPHIL NFR BLD AUTO: 0.2 %
ERYTHROCYTE [DISTWIDTH] IN BLOOD BY AUTOMATED COUNT: 12.4 % (ref 11.5–14.5)
GFR SERPL CREATININE-BSD FRML MDRD: >90 ML/MIN/1.73M*2
GLUCOSE SERPL-MCNC: 105 MG/DL (ref 74–99)
HCT VFR BLD AUTO: 39.5 % (ref 36–46)
HGB BLD-MCNC: 13.5 G/DL (ref 12–16)
IMM GRANULOCYTES # BLD AUTO: 0.04 X10*3/UL (ref 0–0.7)
IMM GRANULOCYTES NFR BLD AUTO: 0.7 % (ref 0–0.9)
LYMPHOCYTES # BLD AUTO: 1.16 X10*3/UL (ref 1.2–4.8)
LYMPHOCYTES NFR BLD AUTO: 21.1 %
MCH RBC QN AUTO: 29 PG (ref 26–34)
MCHC RBC AUTO-ENTMCNC: 34.2 G/DL (ref 32–36)
MCV RBC AUTO: 85 FL (ref 80–100)
MONOCYTES # BLD AUTO: 0.37 X10*3/UL (ref 0.1–1)
MONOCYTES NFR BLD AUTO: 6.7 %
NEUTROPHILS # BLD AUTO: 3.88 X10*3/UL (ref 1.2–7.7)
NEUTROPHILS NFR BLD AUTO: 70.8 %
NRBC BLD-RTO: 0 /100 WBCS (ref 0–0)
PLATELET # BLD AUTO: 162 X10*3/UL (ref 150–450)
POTASSIUM SERPL-SCNC: 4.1 MMOL/L (ref 3.5–5.3)
PROT SERPL-MCNC: 7 G/DL (ref 6.4–8.2)
RBC # BLD AUTO: 4.66 X10*6/UL (ref 4–5.2)
SODIUM SERPL-SCNC: 138 MMOL/L (ref 136–145)
TSH SERPL-ACNC: 0.83 MIU/L (ref 0.44–3.98)
WBC # BLD AUTO: 5.5 X10*3/UL (ref 4.4–11.3)

## 2023-12-29 PROCEDURE — 80053 COMPREHEN METABOLIC PANEL: CPT

## 2023-12-29 PROCEDURE — 85025 COMPLETE CBC W/AUTO DIFF WBC: CPT

## 2023-12-29 PROCEDURE — 84443 ASSAY THYROID STIM HORMONE: CPT

## 2023-12-29 PROCEDURE — 82607 VITAMIN B-12: CPT

## 2023-12-29 PROCEDURE — 83001 ASSAY OF GONADOTROPIN (FSH): CPT

## 2023-12-29 PROCEDURE — 36415 COLL VENOUS BLD VENIPUNCTURE: CPT

## 2023-12-29 PROCEDURE — 83002 ASSAY OF GONADOTROPIN (LH): CPT

## 2023-12-29 RX ORDER — TRAZODONE HYDROCHLORIDE 100 MG/1
100 TABLET ORAL NIGHTLY PRN
Qty: 30 TABLET | Refills: 1 | Status: SHIPPED | OUTPATIENT
Start: 2023-12-29 | End: 2024-01-24

## 2023-12-29 NOTE — TELEPHONE ENCOUNTER
Pt states she has a few good nights on the 50mg of trazodone and then a few bad nights. Does not know if she should increase the dose or have a sleep study. Just wants to know what next steps should be.

## 2023-12-30 LAB
FSH SERPL-ACNC: 6.2 IU/L
LH SERPL-ACNC: 5.2 IU/L
VIT B12 SERPL-MCNC: 429 PG/ML (ref 211–911)

## 2024-01-19 NOTE — PROGRESS NOTES
Margarita Ortega is a 43 y.o. female who presents for Follow-up (6 weeks; snoring getting worse; stuffed nose for over a month, not feeling sick, has taken multiple covid tests; hot flashes, not every night; doing well on trazodone)    Insomnia: She is taking prozac, no SE's. Feels it is helping but she is having issues with sleeping, hydroxyzine is helping. Sometimes she takes 100mg and sometimes she takes 50mg. If she stays up late she will take the 50mg so she is not fatigued in the morning.    Nasal congestion: Started about a month ago. No rhinorrhea, no fever, sometimes HA and sinus pressure. Two sick contact with COVID although she had multiple tests negative. No PND or rhinorrhea. No cough or sore throat. Snoring is worse than normal. Nasal strips are not even helping.      Chest pain: Still having some occasional chest pain. Her son passed away from a drowning this summer. Not interested in med currently, she is scheduled to begin counselling for this soon.      Back pain: Still with pain in the left side of her lower back off and on, gets it with standing, housework. Not radiating into the legs anymore. Completed xray. She is taking a supplement for arthritis. She is waking up with back pain, pain is worse in the morning.      Carpal tunnel syndrome: Previously was doing well with bracing. She is having paresthesia (pins and needles sensation) in 3rd and 4th digit of right hand. Was getting it intermittently but for the last few months it has been constant but varying in intensity.      Prediabetes: Not on meds. Weight has been stable.      Dizziness: Likely from vertigo. She has made adjustments in her activities and had significant improvement.      Heavy periods: s/p hysterectomy in May. She is getting night sweats and hot flashes intermittently. Low sex drive.      She has 4 boys. One is in college in [place], 3 are 11, 13, 16. Moved here 2y ago.      All other systems have been reviewed and are negative  "for complaint    Objective   /74 (BP Location: Left arm, Patient Position: Sitting, BP Cuff Size: Large adult)   Pulse 95   Ht 1.651 m (5' 5\")   Wt 100 kg (221 lb)   LMP 05/11/2023   BMI 36.78 kg/m²     Gen: No acute distress, alert and oriented x3, pleasant   HEENT: moist mucous membranes, b/l external auditory canals are clear of debris, TMs within normal limits, no oropharyngeal lesions, eomi, perrla, B/L TM's with effusion, nasal congestion noted  Neck: thyroid within normal limits, no lymphadenopathy   CV: RRR, normal S1/S2, no murmur   Resp: Clear to auscultation bilaterally, no wheezes or rhonchi appreciated  Abd: soft, nontender, non-distended, no guarding/rigidity, bowel sounds present  Extr: no edema, no calf tenderness  Derm: Skin is warm and dry, no rashes appreciated  Psych: mood is good, affect is congruent, good hygiene, normal speech and eye contact  Neuro: cranial nerves grossly intact, normal gait    Assessment/Plan     #Hot flashes  Recommending to touch base with gyn about HRT    #Insomnia  Doing well on trazodone  Labs were negative  Ordering sleep study    #Sinus congestion  On antihistamine, switch to allegra  Rx sent keflex  Recommend a round with steroid nasal spary     #Grief  Doing reasonably well on fluoxetine     #Chest pain:  likely anxiety  Declining meds for now     #Back pain with radiculopathy  Xrays show DJD  Did ok with medrol pack, muscle relaxer  Declining PT at this point     #Carpal tunnel syndrome:   Previously doing well with nighttime bracing  Previously referred to ortho hand to discuss carpal tunnel surgery  Holding off on eval now while symptoms are better     #Environmental allergies  on cetirizine  she gets nasacort otc  she takes flonase during winter but it doesn't help as much  she also uses eye drops (pataday)     #Prediabetes:  doing well since weight loss  check labs      #Heavy periods:  S/p laparoscopic assisted vaginal hysterectomy in May   "   HCM:  Mammogram Nov was negative  Declining flu vaccine

## 2024-01-24 DIAGNOSIS — F51.01 PRIMARY INSOMNIA: ICD-10-CM

## 2024-01-24 RX ORDER — TRAZODONE HYDROCHLORIDE 100 MG/1
100 TABLET ORAL NIGHTLY PRN
Qty: 30 TABLET | Refills: 1 | Status: SHIPPED | OUTPATIENT
Start: 2024-01-24 | End: 2024-03-25

## 2024-01-26 ENCOUNTER — OFFICE VISIT (OUTPATIENT)
Dept: PRIMARY CARE | Facility: CLINIC | Age: 44
End: 2024-01-26
Payer: COMMERCIAL

## 2024-01-26 VITALS
HEART RATE: 95 BPM | WEIGHT: 221 LBS | DIASTOLIC BLOOD PRESSURE: 74 MMHG | BODY MASS INDEX: 36.82 KG/M2 | SYSTOLIC BLOOD PRESSURE: 107 MMHG | HEIGHT: 65 IN

## 2024-01-26 DIAGNOSIS — G47.19 EXCESSIVE DAYTIME SLEEPINESS: Primary | ICD-10-CM

## 2024-01-26 DIAGNOSIS — J01.90 ACUTE SINUSITIS, RECURRENCE NOT SPECIFIED, UNSPECIFIED LOCATION: ICD-10-CM

## 2024-01-26 PROCEDURE — 1036F TOBACCO NON-USER: CPT | Performed by: FAMILY MEDICINE

## 2024-01-26 PROCEDURE — 3008F BODY MASS INDEX DOCD: CPT | Performed by: FAMILY MEDICINE

## 2024-01-26 PROCEDURE — 99214 OFFICE O/P EST MOD 30 MIN: CPT | Performed by: FAMILY MEDICINE

## 2024-01-26 RX ORDER — CEPHALEXIN 500 MG/1
500 CAPSULE ORAL 2 TIMES DAILY
Qty: 20 CAPSULE | Refills: 0 | Status: SHIPPED | OUTPATIENT
Start: 2024-01-26 | End: 2024-02-05

## 2024-01-30 ENCOUNTER — HOSPITAL ENCOUNTER (EMERGENCY)
Facility: HOSPITAL | Age: 44
Discharge: HOME | End: 2024-01-30
Attending: FAMILY MEDICINE
Payer: COMMERCIAL

## 2024-01-30 ENCOUNTER — APPOINTMENT (OUTPATIENT)
Dept: RADIOLOGY | Facility: HOSPITAL | Age: 44
End: 2024-01-30
Payer: COMMERCIAL

## 2024-01-30 VITALS
BODY MASS INDEX: 36.04 KG/M2 | SYSTOLIC BLOOD PRESSURE: 137 MMHG | HEIGHT: 65 IN | OXYGEN SATURATION: 97 % | TEMPERATURE: 99 F | DIASTOLIC BLOOD PRESSURE: 86 MMHG | WEIGHT: 216.3 LBS | HEART RATE: 97 BPM | RESPIRATION RATE: 16 BRPM

## 2024-01-30 DIAGNOSIS — M25.572 ACUTE LEFT ANKLE PAIN: Primary | ICD-10-CM

## 2024-01-30 DIAGNOSIS — M25.472 LEFT ANKLE SWELLING: ICD-10-CM

## 2024-01-30 PROBLEM — R07.9 CHEST PAIN: Status: ACTIVE | Noted: 2024-01-30

## 2024-01-30 PROBLEM — G56.00 CARPAL TUNNEL SYNDROME: Status: ACTIVE | Noted: 2024-01-30

## 2024-01-30 PROBLEM — B96.89 ACUTE BACTERIAL PHARYNGITIS: Status: ACTIVE | Noted: 2024-01-30

## 2024-01-30 PROBLEM — R07.0 PAIN IN THROAT: Status: ACTIVE | Noted: 2024-01-30

## 2024-01-30 PROBLEM — N92.0 MENORRHAGIA: Status: ACTIVE | Noted: 2023-02-03

## 2024-01-30 PROBLEM — M54.9 BACK PAIN: Status: ACTIVE | Noted: 2023-02-03

## 2024-01-30 PROBLEM — J02.8 ACUTE BACTERIAL PHARYNGITIS: Status: ACTIVE | Noted: 2024-01-30

## 2024-01-30 PROBLEM — R10.2 PELVIC PAIN IN FEMALE: Status: ACTIVE | Noted: 2024-01-30

## 2024-01-30 PROBLEM — R09.81 NASAL CONGESTION: Status: ACTIVE | Noted: 2024-01-30

## 2024-01-30 PROBLEM — J02.0 PHARYNGITIS DUE TO GROUP A BETA HEMOLYTIC STREPTOCOCCI: Status: ACTIVE | Noted: 2024-01-30

## 2024-01-30 PROBLEM — D25.9 UTERINE LEIOMYOMA: Status: ACTIVE | Noted: 2023-07-17

## 2024-01-30 PROBLEM — E66.9 OBESITY: Status: ACTIVE | Noted: 2023-02-03

## 2024-01-30 PROCEDURE — 73610 X-RAY EXAM OF ANKLE: CPT | Mod: LEFT SIDE

## 2024-01-30 PROCEDURE — 73610 X-RAY EXAM OF ANKLE: CPT | Mod: LT

## 2024-01-30 PROCEDURE — 2500000001 HC RX 250 WO HCPCS SELF ADMINISTERED DRUGS (ALT 637 FOR MEDICARE OP): Mod: SE

## 2024-01-30 PROCEDURE — 99283 EMERGENCY DEPT VISIT LOW MDM: CPT | Performed by: FAMILY MEDICINE

## 2024-01-30 RX ORDER — FEXOFENADINE HCL 60 MG
60 TABLET ORAL DAILY
COMMUNITY
End: 2024-04-11 | Stop reason: ALTCHOICE

## 2024-01-30 RX ORDER — IBUPROFEN 600 MG/1
TABLET ORAL
Status: COMPLETED
Start: 2024-01-30 | End: 2024-01-30

## 2024-01-30 RX ORDER — IBUPROFEN 600 MG/1
600 TABLET ORAL ONCE
Status: COMPLETED | OUTPATIENT
Start: 2024-01-30 | End: 2024-01-30

## 2024-01-30 RX ADMIN — IBUPROFEN 600 MG: 600 TABLET ORAL at 09:24

## 2024-01-30 ASSESSMENT — PAIN - FUNCTIONAL ASSESSMENT
PAIN_FUNCTIONAL_ASSESSMENT: 0-10
PAIN_FUNCTIONAL_ASSESSMENT: 0-10

## 2024-01-30 ASSESSMENT — COLUMBIA-SUICIDE SEVERITY RATING SCALE - C-SSRS
6. HAVE YOU EVER DONE ANYTHING, STARTED TO DO ANYTHING, OR PREPARED TO DO ANYTHING TO END YOUR LIFE?: NO
2. HAVE YOU ACTUALLY HAD ANY THOUGHTS OF KILLING YOURSELF?: NO
1. IN THE PAST MONTH, HAVE YOU WISHED YOU WERE DEAD OR WISHED YOU COULD GO TO SLEEP AND NOT WAKE UP?: NO

## 2024-01-30 ASSESSMENT — PAIN SCALES - GENERAL
PAINLEVEL_OUTOF10: 2
PAINLEVEL_OUTOF10: 2

## 2024-01-30 NOTE — ED PROVIDER NOTES
HPI   Chief Complaint   Patient presents with    Leg Swelling     Ankle, left       43-year-old female comes the ED with complaint of left ankle pain with swelling that has been going on intermittently for the last several weeks.  Patient reports it felt more aggravated today and she cannot bear enough weight to want a go to work.  Patient reports he called off and came to the ED for evaluation.  Patient reports that she does not recall any event or thinks she may have done that led to the swelling and pain.  Patient does state that when she stands up for long time at work she notes after coming home that she sees a swelling and has increased pain.  Patient states that she is taken some Motrin during this period of time and it does help some.  Patient in the ED is alert, cooperative, appears anxious, uncomfortable, but in no distress.  Patient currently denies fall, recent travel, back pain, foot pain, calf pain, lower leg pain, fevers, loss sensation, numbness/tingling, ataxia, and weakness.      History provided by:  Patient and significant other   used: No                        No data recorded                Patient History   History reviewed. No pertinent past medical history.  Past Surgical History:   Procedure Laterality Date    PARTIAL HYSTERECTOMY       Family History   Problem Relation Name Age of Onset    Breast cancer Father's Sister          age unknown     Social History     Tobacco Use    Smoking status: Former     Types: Cigarettes     Quit date: 2000     Years since quittin.0    Smokeless tobacco: Never   Substance Use Topics    Alcohol use: Never    Drug use: Never       Physical Exam   ED Triage Vitals [24 0854]   Temperature Heart Rate Respirations BP   37.2 °C (99 °F) 97 16 137/86      SpO2 Temp Source Heart Rate Source Patient Position   97 % Tympanic Monitor --      BP Location FiO2 (%)     -- --       Physical Exam  Vitals and nursing note reviewed.    Constitutional:       General: She is not in acute distress.     Appearance: She is well-developed.   HENT:      Head: Normocephalic and atraumatic.   Eyes:      Conjunctiva/sclera: Conjunctivae normal.   Cardiovascular:      Rate and Rhythm: Normal rate and regular rhythm.      Heart sounds: No murmur heard.  Pulmonary:      Effort: Pulmonary effort is normal. No respiratory distress.      Breath sounds: Normal breath sounds.   Abdominal:      Palpations: Abdomen is soft.      Tenderness: There is no abdominal tenderness.   Musculoskeletal:         General: No swelling.      Cervical back: Neck supple.      Left knee: Normal.      Left lower leg: Normal.      Left ankle: Swelling present. No deformity, ecchymosis or lacerations. Tenderness present. Normal range of motion. Anterior drawer test negative. Normal pulse.      Left Achilles Tendon: Normal.      Left foot: Normal.        Legs:       Comments: Mild lateral malleolus swelling with mild tenderness but good range of motion and good strength  Good pulses   Skin:     General: Skin is warm and dry.      Capillary Refill: Capillary refill takes less than 2 seconds.   Neurological:      Mental Status: She is alert.   Psychiatric:         Mood and Affect: Mood normal.       XR ankle left 3+ views   Final Result   No acute osseous abnormality             MACRO:   None        Signed by: Willa Salgado 1/30/2024 9:40 AM   Dictation workstation:   [id number]          ED Course & MDM   Diagnoses as of 01/30/24 1510   Acute left ankle pain   Left ankle swelling       Medical Decision Making  Patient upon arrival to the ED appeared to be uncomfortable but in no distress stable vital signs.  Discussed with patient/family the presenting complaints and clinical findings.  Reviewed them patient's epic chart and counseled them on ankle injuries and appropriate approach to management/treatments.  After assessment and evaluation ankle was elevated, ice applied, imaging  ordered, and patient observed.  After period of rest patient was reassessed and found to be feeling more comfortable, patient was able to stand and ambulate with limited discomfort, patient had no new physical complaints, vital signs stable, and imaging results were reviewed/discussed with patient/family.  At this time patient's ankle was Ace wrap for comfort and she is educated in the use of over-the-counter medication for management of discomfort.  Patient was given resources to contact and follow-up with podiatry.  Patient stable and discharged home.    Amount and/or Complexity of Data Reviewed  External Data Reviewed: labs, radiology and notes.  Radiology: ordered. Decision-making details documented in ED Course.    Risk  OTC drugs.        Procedure  Procedures     Felix Flynn MD  01/30/24 6688

## 2024-02-13 ENCOUNTER — TELEPHONE (OUTPATIENT)
Dept: PRIMARY CARE | Facility: CLINIC | Age: 44
End: 2024-02-13
Payer: COMMERCIAL

## 2024-02-13 DIAGNOSIS — J01.90 ACUTE SINUSITIS, RECURRENCE NOT SPECIFIED, UNSPECIFIED LOCATION: Primary | ICD-10-CM

## 2024-02-13 RX ORDER — DOXYCYCLINE 100 MG/1
100 CAPSULE ORAL 2 TIMES DAILY
Qty: 20 CAPSULE | Refills: 0 | Status: SHIPPED | OUTPATIENT
Start: 2024-02-13 | End: 2024-02-23

## 2024-02-13 NOTE — TELEPHONE ENCOUNTER
Margarita called in with a complaint of ear pain, mainly left with some pain in right. She tested for Covid yesterday which is when her Sx started. She is unsure if this is a Sx of Covid or if she may have an ear infection. Please advise.

## 2024-02-14 NOTE — TELEPHONE ENCOUNTER
If she took and finished the keflex from her last appt I am going to sent in doxycycline. It is stronger so I recommend a probiotic.

## 2024-02-16 ENCOUNTER — APPOINTMENT (OUTPATIENT)
Dept: OBSTETRICS AND GYNECOLOGY | Facility: CLINIC | Age: 44
End: 2024-02-16
Payer: COMMERCIAL

## 2024-02-20 ENCOUNTER — APPOINTMENT (OUTPATIENT)
Dept: SLEEP MEDICINE | Facility: CLINIC | Age: 44
End: 2024-02-20
Payer: COMMERCIAL

## 2024-02-25 ENCOUNTER — CLINICAL SUPPORT (OUTPATIENT)
Dept: SLEEP MEDICINE | Facility: CLINIC | Age: 44
End: 2024-02-25
Payer: COMMERCIAL

## 2024-02-25 ENCOUNTER — HOSPITAL ENCOUNTER (EMERGENCY)
Facility: HOSPITAL | Age: 44
Discharge: HOME | End: 2024-02-25
Attending: EMERGENCY MEDICINE
Payer: COMMERCIAL

## 2024-02-25 VITALS
TEMPERATURE: 98.2 F | SYSTOLIC BLOOD PRESSURE: 132 MMHG | WEIGHT: 215 LBS | DIASTOLIC BLOOD PRESSURE: 70 MMHG | OXYGEN SATURATION: 99 % | HEIGHT: 65 IN | HEART RATE: 70 BPM | RESPIRATION RATE: 18 BRPM | BODY MASS INDEX: 35.82 KG/M2

## 2024-02-25 VITALS
OXYGEN SATURATION: 98 % | WEIGHT: 215 LBS | SYSTOLIC BLOOD PRESSURE: 131 MMHG | BODY MASS INDEX: 35.82 KG/M2 | HEIGHT: 65 IN | RESPIRATION RATE: 14 BRPM | HEART RATE: 77 BPM | DIASTOLIC BLOOD PRESSURE: 75 MMHG

## 2024-02-25 DIAGNOSIS — S61.012A THUMB LACERATION, LEFT, INITIAL ENCOUNTER: Primary | ICD-10-CM

## 2024-02-25 DIAGNOSIS — G47.19 EXCESSIVE DAYTIME SLEEPINESS: ICD-10-CM

## 2024-02-25 DIAGNOSIS — G47.33 OBSTRUCTIVE SLEEP APNEA (ADULT) (PEDIATRIC): ICD-10-CM

## 2024-02-25 PROCEDURE — 90471 IMMUNIZATION ADMIN: CPT

## 2024-02-25 PROCEDURE — 90715 TDAP VACCINE 7 YRS/> IM: CPT | Mod: SE

## 2024-02-25 PROCEDURE — 99282 EMERGENCY DEPT VISIT SF MDM: CPT | Mod: 25

## 2024-02-25 PROCEDURE — 95810 POLYSOM 6/> YRS 4/> PARAM: CPT | Performed by: PSYCHIATRY & NEUROLOGY

## 2024-02-25 PROCEDURE — 2500000004 HC RX 250 GENERAL PHARMACY W/ HCPCS (ALT 636 FOR OP/ED): Mod: SE

## 2024-02-25 PROCEDURE — 12001 RPR S/N/AX/GEN/TRNK 2.5CM/<: CPT

## 2024-02-25 RX ADMIN — TETANUS TOXOID, REDUCED DIPHTHERIA TOXOID AND ACELLULAR PERTUSSIS VACCINE, ADSORBED 0.5 ML: 5; 2.5; 8; 8; 2.5 SUSPENSION INTRAMUSCULAR at 18:35

## 2024-02-25 ASSESSMENT — SLEEP AND FATIGUE QUESTIONNAIRES
LYING DOWN TO REST OR NAP IN THE AFTERNOON: 2
SITTING AND WATCHING TV OR A VIDEO: 1
SITTING AND EATING A MEAL: 0
ESS TOTAL SCORE: 6
SITTING AND TALKING TO SOMEONE: 0
SITTING AND RIDING IN A CAR OR BUS FOR ABOUT HALF AN HOUR: 1
SITTING AND READING: 1
ESS-CHAD TOTAL SCORE: 6
SITTING IN A CLASSROOM AT SCHOOL DURING THE MORNING: 0
SITTING QUITELY BY YOURSELF AFTER LUNCH: 1

## 2024-02-25 ASSESSMENT — PAIN SCALES - GENERAL
PAINLEVEL_OUTOF10: 4
PAINLEVEL: 4

## 2024-02-25 ASSESSMENT — PAIN - FUNCTIONAL ASSESSMENT: PAIN_FUNCTIONAL_ASSESSMENT: 0-10

## 2024-02-25 NOTE — DISCHARGE INSTRUCTIONS
Bulky dressing for 24 hours.    Keep wound clean and dry for 5 days.    Return for worsening symptoms or concerns.

## 2024-02-25 NOTE — ED PROVIDER NOTES
[facility]   ED  Provider Note  2/25/2024  6:04 PM  AC04/AC04        History of Present Illness:   Margarita Ortega is a 43 y.o. female presenting to the ED for , beginning just prior to arrival.  The complaint has been persistent, mild in severity, and worsened by nothing.  Patient cut through her distal thumbnail with a sharp knife.  She has a 0.25 cm laceration through the nail distally with no active bleeding at this time.  Her tetanus is not up-to-date.      Review of Systems:   Pertinent positives and review of systems as noted above.  Remaining 10 review of systems is negative or noncontributory to today's episode of care.  Review of Systems       --------------------------------------------- PAST HISTORY ---------------------------------------------  Past Medical History:  has no past medical history on file.    Past Surgical History:  has a past surgical history that includes Partial hysterectomy and Cholecystectomy.    Social History:  reports that she quit smoking about 24 years ago. Her smoking use included cigarettes. She has never used smokeless tobacco. She reports that she does not drink alcohol and does not use drugs.    Family History: family history includes Breast cancer in her father's sister. Unless otherwise noted, family history is non contributory    Patient's Medications   New Prescriptions    No medications on file   Previous Medications    CETIRIZINE (ZYRTEC) 10 MG TABLET    Take 1 tablet (10 mg) by mouth once daily at bedtime. Takes during spring summer    EYE ITCH RELIEF 0.025 % (0.035 %) OPHTHALMIC SOLUTION    ADMINISTER ONE DROP IN THE AFFECTED EYE EVERY TWELVE HOURS AS NEEDED    FEXOFENADINE (ALLEGRA) 60 MG TABLET    Take 1 tablet (60 mg) by mouth once daily.    FLUOXETINE (PROZAC) 20 MG CAPSULE    Take 1 capsule (20 mg) by mouth once daily.    MOMETASONE (NASONEX) 50 MCG/ACTUATION NASAL SPRAY    Administer 2 sprays into each nostril once daily.    TRAZODONE (DESYREL) 100 MG TABLET     "TAKE ONE TABLET BY MOUTH AT BEDTIME AS NEEDED FOR SLEEP   Modified Medications    No medications on file   Discontinued Medications    No medications on file      The patient’s home medications have been reviewed.    Allergies: Wuodzbh-[id number]-zbmcopwvyzhq and Midodrine    -------------------------------------------------- RESULTS -------------------------------------------------  All laboratory and radiology results have been personally reviewed by myself   LABS:  Labs Reviewed - No data to display      RADIOLOGY:  Interpreted by Radiologist.  No orders to display       No results found for this or any previous visit (from the past 4464 hour(s)).  ------------------------- NURSING NOTES AND VITALS REVIEWED ---------------------------   The nursing notes within the ED encounter and vital signs as below have been reviewed.   /83   Pulse 77   Temp 36.8 °C (98.2 °F) (Tympanic)   Resp 18   Ht 1.651 m (5' 5\")   Wt 97.5 kg (215 lb)   LMP 05/11/2023   SpO2 99%   BMI 35.78 kg/m²   Oxygen Saturation Interpretation: Normal      ---------------------------------------------------PHYSICAL EXAM--------------------------------------  Physical Exam   Constitutional/General: Alert and oriented x3, well appearing, non toxic in NAD  Head: Normocephalic and atraumatic  Eyes: PERRL, EOMI, conjunctiva normal, sclera non icteric  Mouth: Oropharynx clear, handling secretions, no trismus, no asymmetry of the posterior oropharynx or uvular edema  Neck: Supple, full ROM, non tender to palpation in the midline, no stridor, no crepitus, no meningeal signs  Respiratory: Lungs clear to auscultation bilaterally, no wheezes, rales, or rhonchi. Not in respiratory distress  Cardiovascular:  Regular rate. Regular rhythm. No murmurs, gallops, or rubs. 2+ distal pulses  Chest: No chest wall tenderness  GI:  Abdomen Soft, Non tender, Non distended.  +BS. No organomegaly, no palpable masses,  No rebound, guarding, or rigidity. "   Musculoskeletal: Moves all extremities x 4. Warm and well perfused, no clubbing, cyanosis, or edema. Capillary refill <3 seconds  Integument: skin warm and dry. No rashes.  Superficial laceration through the nailbed distally 1/4 cm in size without active bleeding.  Lymphatic: no lymphadenopathy noted  Neurologic: No focal deficits, symmetric strength 5/5 in the upper and lower extremities bilaterally  Psychiatric: Normal Affect    Procedures    ------------------------------ ED COURSE/MEDICAL DECISION MAKING----------------------  Diagnoses as of 02/25/24 1841   Thumb laceration, left, initial encounter      Patient has superficial laceration through the thumbnail 1/4 cm in size.  There is no active bleeding.  There is no evidence of significant nailbed laceration.  The wound was cleaned, the nail was glued in place, a Band-Aid and bulky dressing was placed.  Her tetanus shot was updated.  She is stable for outpatient management      Medical Decision Making:   Discharge    Diagnoses as of 02/25/24 1841   Thumb laceration, left, initial encounter      Counseling:   The emergency provider has spoken with the patient and discussed today’s results, in addition to providing specific details for the plan of care and counseling regarding the diagnosis and prognosis.  Questions are answered at this time and they are agreeable with the plan.      --------------------------------- IMPRESSION AND DISPOSITION ---------------------------------        IMPRESSION  1. Thumb laceration, left, initial encounter        DISPOSITION  Disposition: Discharge to home  Patient condition is good      Billing Provider Critical Care Time: 0 minutes     Immanuel Ramsey MD  02/25/24 7294

## 2024-02-26 NOTE — PROGRESS NOTES
[facility] TECH NOTE:     Patient: Margarita Ortega   MRN//AGE: [id number]  1980  43 y.o.   Technologist: Selene Anne   Room: 108   Service Date: 2024        Sleep Testing Location: [facility]:     TECHNOLOGIST SLEEP STUDY PROCEDURE NOTE:   This sleep study is being conducted according to the policies and procedures outlined by the AAS accreditation standards.  The sleep study procedure and processes involved during this appointment was explained to the patient/patient’s family, questions were answered. The patient/family verbalized understanding.      The patient is a 43 y.o. year old female scheduled for a Diagnostic PSG Split night.  She arrived for her appointment.      The study that was ultimately completed was a Diagnostic PSG.      The full study Was completed.  Patient questionnaires completed?: yes     Consents signed? yes    Initial Fall Risk Screening:     Margarita has not fallen in the last 6 months. Margarita does not have a fear of falling. She does not need assistance with sitting, standing, or walking. She does not need assistance walking in her home. She does not need assistance in an unfamiliar setting. The patient is not using an assistive device.     Brief Study observations: 43 year old female presents for a Diagnostic split night PSG. Criteria was not met to split during the study due to AHI of <15. Patient was observed to have: hypopneas, flow limitations, mild/intermittent/positional snoring, leg twitches and leg jerks.  Patient woke once to use the restroom during the night. NSR observed. Several PVCs observed. REM was observed.     Deviation to order/protocol and reason: none      If PAP, which was preferred mask/pressure/mode: NA    Other:None    After the procedure, the patient/family was informed to ensure followup with ordering clinician for testing results.      Technologist: Selene Anne

## 2024-03-15 ENCOUNTER — APPOINTMENT (OUTPATIENT)
Dept: OBSTETRICS AND GYNECOLOGY | Facility: CLINIC | Age: 44
End: 2024-03-15
Payer: COMMERCIAL

## 2024-03-22 ENCOUNTER — OFFICE VISIT (OUTPATIENT)
Dept: OBSTETRICS AND GYNECOLOGY | Facility: CLINIC | Age: 44
End: 2024-03-22
Payer: COMMERCIAL

## 2024-03-22 VITALS
HEIGHT: 64 IN | BODY MASS INDEX: 36.7 KG/M2 | WEIGHT: 215 LBS | SYSTOLIC BLOOD PRESSURE: 132 MMHG | DIASTOLIC BLOOD PRESSURE: 80 MMHG

## 2024-03-22 DIAGNOSIS — N95.1 MENOPAUSAL FLUSHING: Primary | ICD-10-CM

## 2024-03-22 PROCEDURE — 3008F BODY MASS INDEX DOCD: CPT | Performed by: OBSTETRICS & GYNECOLOGY

## 2024-03-22 PROCEDURE — 1036F TOBACCO NON-USER: CPT | Performed by: OBSTETRICS & GYNECOLOGY

## 2024-03-22 PROCEDURE — 99214 OFFICE O/P EST MOD 30 MIN: CPT | Performed by: OBSTETRICS & GYNECOLOGY
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
## 2024-03-24 DIAGNOSIS — Z63.4 GRIEF AT LOSS OF CHILD: ICD-10-CM

## 2024-03-24 DIAGNOSIS — F43.21 GRIEF AT LOSS OF CHILD: ICD-10-CM

## 2024-03-24 DIAGNOSIS — F51.01 PRIMARY INSOMNIA: ICD-10-CM

## 2024-03-24 SDOH — SOCIAL STABILITY - SOCIAL INSECURITY: DISSAPEARANCE AND DEATH OF FAMILY MEMBER: Z63.4

## 2024-03-25 RX ORDER — TRAZODONE HYDROCHLORIDE 100 MG/1
100 TABLET ORAL NIGHTLY PRN
Qty: 30 TABLET | Refills: 1 | Status: SHIPPED | OUTPATIENT
Start: 2024-03-25 | End: 2024-05-22

## 2024-03-25 RX ORDER — FLUOXETINE HYDROCHLORIDE 20 MG/1
20 CAPSULE ORAL DAILY
Qty: 30 CAPSULE | Refills: 5 | Status: SHIPPED | OUTPATIENT
Start: 2024-03-25

## 2024-03-29 ENCOUNTER — HOSPITAL ENCOUNTER (OUTPATIENT)
Dept: RADIOLOGY | Facility: HOSPITAL | Age: 44
Discharge: HOME | End: 2024-03-29
Payer: COMMERCIAL

## 2024-03-29 DIAGNOSIS — M25.572 PAIN IN LEFT ANKLE AND JOINTS OF LEFT FOOT: ICD-10-CM

## 2024-03-29 PROCEDURE — 73721 MRI JNT OF LWR EXTRE W/O DYE: CPT | Mod: LT

## 2024-03-29 PROCEDURE — 73721 MRI JNT OF LWR EXTRE W/O DYE: CPT | Mod: LEFT SIDE | Performed by: RADIOLOGY

## 2024-04-02 NOTE — PROGRESS NOTES
Margarita Ortega is a 43 y.o. female who presents for Follow-up (Surgery on April 24th, left ankle, Dr. Beltran; )    Ankle pain: Following with Dr. Beltran. She did try wearing a boot for 3 weeks and was off it when she had COVID. 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 She is currently scheduled on 4/24 for surgery. She does have history of chest pain moreso from anxiety which is chronic for her    Insomnia: She is taking prozac, no SE's. Feels it is helping but she is having issues with sleeping, hydroxyzine is helping. Sometimes she takes 100mg and sometimes she takes 50mg. If she stays up late she will take the 50mg so she is not fatigued in the morning. No shortness of breath. No palpitations or heart racing.      Nasal congestion: Started about a month ago. No rhinorrhea, no fever, sometimes HA and sinus pressure. Two sick contact with COVID although she had multiple tests negative. No PND or rhinorrhea. No cough or sore throat. Snoring is worse than normal. Nasal strips are not even helping.      Chest pain: Still having some occasional chest pain. Her son passed away from a drowning this summer. Not interested in med currently, she is scheduled to begin counselling for this soon.      Back pain: Still with pain in the left side of her lower back off and on, gets it with standing, housework. Not radiating into the legs anymore. Completed xray. She is taking a supplement for arthritis. She is waking up with back pain, pain is worse in the morning.      Carpal tunnel syndrome: Previously was doing well with bracing. She is having paresthesia (pins and needles sensation) in 3rd and 4th digit of right hand. Was getting it intermittently but for the last few months it has been constant but varying in intensity.      Prediabetes: Not on meds. Weight has been stable.      Dizziness: Likely from vertigo. She has made adjustments in her activities and had significant improvement.      Heavy  periods: s/p hysterectomy in May. She is getting night sweats and hot flashes intermittently. Low sex drive.      She has 4 boys. One is in college in [place], 3 are 11, 13, 16. Moved here 2y ago.      All other systems have been reviewed and are negative for complaint    Objective   /61 (BP Location: Right arm, Patient Position: Sitting, BP Cuff Size: Large adult)   Pulse 67   Wt 102 kg (224 lb 9.6 oz)   LMP 05/11/2023   BMI 38.55 kg/m²     Gen: No acute distress, alert and oriented x3, pleasant   HEENT: moist mucous membranes, b/l external auditory canals are clear of debris, TMs within normal limits, no oropharyngeal lesions, eomi, perrla   Neck: thyroid within normal limits, no lymphadenopathy   CV: RRR, normal S1/S2, no murmur   Resp: Clear to auscultation bilaterally, no wheezes or rhonchi appreciated  Abd: soft, nontender, non-distended, no guarding/rigidity, bowel sounds present  Extr: no edema, no calf tenderness  Derm: Skin is warm and dry, no rashes appreciated  Psych: mood is good, affect is congruent, good hygiene, normal speech and eye contact  Neuro: cranial nerves grossly intact, normal gait    Assessment/Plan     #Ankle pain  Testing ordered    ADDENDUM: Testing reviewed including cardiac stress test. Patient is clear for surgery from my perspective.     #Chest pain:  Likely stress  Check stress test    #Hot flashes  Recommending to touch base with gyn about HRT  Gyn recommended that sx likely related to stress level  They together opted to hold off on HRT     #Insomnia  Doing well on trazodone  Labs were negative  Ordering sleep study     #Sinus congestion  On antihistamine, switch to allegra  She tried keflex and it helped temporarily  She tried flonase  Recommend a round with steroid nasal spray  Trial doxycycline, adding on azelastine as well     #Grief  Doing reasonably well on fluoxetine     #Chest pain:  likely anxiety  Declining meds for now     #Back pain with  radiculopathy  Xrays show DJD  Did ok with medrol pack, muscle relaxer  Declining PT at this point     #Carpal tunnel syndrome:   Previously doing well with nighttime bracing  Previously referred to ortho hand to discuss carpal tunnel surgery  Holding off on eval now while symptoms are better     #Environmental allergies  on cetirizine  she gets nasacort otc  she takes flonase during winter but it doesn't help as much  she also uses eye drops (pataday)     #Prediabetes:  doing well since weight loss  check labs      #Heavy periods:  S/p laparoscopic assisted vaginal hysterectomy in May     HCM:  Mammogram Nov was negative  Declining flu vaccine

## 2024-04-05 ENCOUNTER — OFFICE VISIT (OUTPATIENT)
Dept: PRIMARY CARE | Facility: CLINIC | Age: 44
End: 2024-04-05
Payer: COMMERCIAL

## 2024-04-05 ENCOUNTER — LAB (OUTPATIENT)
Dept: LAB | Facility: LAB | Age: 44
End: 2024-04-05
Payer: COMMERCIAL

## 2024-04-05 VITALS
BODY MASS INDEX: 38.55 KG/M2 | DIASTOLIC BLOOD PRESSURE: 61 MMHG | SYSTOLIC BLOOD PRESSURE: 109 MMHG | WEIGHT: 224.6 LBS | HEART RATE: 67 BPM

## 2024-04-05 DIAGNOSIS — S93.409S SPRAIN OF ANKLE, UNSPECIFIED LATERALITY, UNSPECIFIED LIGAMENT, SEQUELA: ICD-10-CM

## 2024-04-05 DIAGNOSIS — R73.03 PREDIABETES: ICD-10-CM

## 2024-04-05 DIAGNOSIS — R07.9 CHEST PAIN, UNSPECIFIED TYPE: Primary | ICD-10-CM

## 2024-04-05 DIAGNOSIS — H69.90 DYSFUNCTION OF EUSTACHIAN TUBE, UNSPECIFIED LATERALITY: ICD-10-CM

## 2024-04-05 DIAGNOSIS — R73.03 PREDIABETES: Primary | ICD-10-CM

## 2024-04-05 LAB
ANION GAP SERPL CALC-SCNC: 10 MMOL/L (ref 10–20)
APPEARANCE UR: CLEAR
BASOPHILS # BLD AUTO: 0.03 X10*3/UL (ref 0–0.1)
BASOPHILS NFR BLD AUTO: 0.4 %
BILIRUB UR STRIP.AUTO-MCNC: NEGATIVE MG/DL
BUN SERPL-MCNC: 16 MG/DL (ref 6–23)
CALCIUM SERPL-MCNC: 9.3 MG/DL (ref 8.6–10.3)
CHLORIDE SERPL-SCNC: 105 MMOL/L (ref 98–107)
CO2 SERPL-SCNC: 27 MMOL/L (ref 21–32)
COLOR UR: YELLOW
CREAT SERPL-MCNC: 0.51 MG/DL (ref 0.5–1.05)
EGFRCR SERPLBLD CKD-EPI 2021: >90 ML/MIN/1.73M*2
EOSINOPHIL # BLD AUTO: 0.05 X10*3/UL (ref 0–0.7)
EOSINOPHIL NFR BLD AUTO: 0.7 %
ERYTHROCYTE [DISTWIDTH] IN BLOOD BY AUTOMATED COUNT: 12.6 % (ref 11.5–14.5)
GLUCOSE SERPL-MCNC: 129 MG/DL (ref 74–99)
GLUCOSE UR STRIP.AUTO-MCNC: ABNORMAL MG/DL
HCT VFR BLD AUTO: 40.1 % (ref 36–46)
HGB BLD-MCNC: 14 G/DL (ref 12–16)
IMM GRANULOCYTES # BLD AUTO: 0.04 X10*3/UL (ref 0–0.7)
IMM GRANULOCYTES NFR BLD AUTO: 0.5 % (ref 0–0.9)
KETONES UR STRIP.AUTO-MCNC: NEGATIVE MG/DL
LEUKOCYTE ESTERASE UR QL STRIP.AUTO: NEGATIVE
LYMPHOCYTES # BLD AUTO: 1.42 X10*3/UL (ref 1.2–4.8)
LYMPHOCYTES NFR BLD AUTO: 19.2 %
MCH RBC QN AUTO: 29.4 PG (ref 26–34)
MCHC RBC AUTO-ENTMCNC: 34.9 G/DL (ref 32–36)
MCV RBC AUTO: 84 FL (ref 80–100)
MONOCYTES # BLD AUTO: 0.41 X10*3/UL (ref 0.1–1)
MONOCYTES NFR BLD AUTO: 5.5 %
NEUTROPHILS # BLD AUTO: 5.44 X10*3/UL (ref 1.2–7.7)
NEUTROPHILS NFR BLD AUTO: 73.7 %
NITRITE UR QL STRIP.AUTO: NEGATIVE
NRBC BLD-RTO: 0 /100 WBCS (ref 0–0)
PH UR STRIP.AUTO: 5 [PH]
PLATELET # BLD AUTO: 192 X10*3/UL (ref 150–450)
POTASSIUM SERPL-SCNC: 3.9 MMOL/L (ref 3.5–5.3)
PROT UR STRIP.AUTO-MCNC: NEGATIVE MG/DL
RBC # BLD AUTO: 4.76 X10*6/UL (ref 4–5.2)
RBC # UR STRIP.AUTO: NEGATIVE /UL
SODIUM SERPL-SCNC: 138 MMOL/L (ref 136–145)
SP GR UR STRIP.AUTO: 1.02
UROBILINOGEN UR STRIP.AUTO-MCNC: <2 MG/DL
WBC # BLD AUTO: 7.4 X10*3/UL (ref 4.4–11.3)

## 2024-04-05 PROCEDURE — 1036F TOBACCO NON-USER: CPT | Performed by: FAMILY MEDICINE

## 2024-04-05 PROCEDURE — 3008F BODY MASS INDEX DOCD: CPT | Performed by: FAMILY MEDICINE

## 2024-04-05 PROCEDURE — 99214 OFFICE O/P EST MOD 30 MIN: CPT | Performed by: FAMILY MEDICINE

## 2024-04-05 PROCEDURE — 81003 URINALYSIS AUTO W/O SCOPE: CPT

## 2024-04-05 PROCEDURE — 85025 COMPLETE CBC W/AUTO DIFF WBC: CPT

## 2024-04-05 PROCEDURE — 80048 BASIC METABOLIC PNL TOTAL CA: CPT

## 2024-04-05 PROCEDURE — 83036 HEMOGLOBIN GLYCOSYLATED A1C: CPT

## 2024-04-05 RX ORDER — DOXYCYCLINE 100 MG/1
100 CAPSULE ORAL 2 TIMES DAILY
Qty: 20 CAPSULE | Refills: 0 | Status: SHIPPED | OUTPATIENT
Start: 2024-04-05 | End: 2024-04-15

## 2024-04-05 RX ORDER — AZELASTINE 1 MG/ML
1 SPRAY, METERED NASAL 2 TIMES DAILY
Qty: 30 ML | Refills: 12 | Status: SHIPPED | OUTPATIENT
Start: 2024-04-05 | End: 2025-04-05

## 2024-04-05 NOTE — PATIENT INSTRUCTIONS
Radiology:  [place]:  [phone]    [place]:  [phone] opt 2     Central Scheduling:  Radiology: [phone]

## 2024-04-06 LAB
EST. AVERAGE GLUCOSE BLD GHB EST-MCNC: 108 MG/DL
HBA1C MFR BLD: 5.4 %

## 2024-04-08 ENCOUNTER — HOSPITAL ENCOUNTER (OUTPATIENT)
Dept: CARDIOLOGY | Facility: HOSPITAL | Age: 44
Discharge: HOME | End: 2024-04-08
Payer: COMMERCIAL

## 2024-04-08 ENCOUNTER — TELEPHONE (OUTPATIENT)
Dept: PRIMARY CARE | Facility: CLINIC | Age: 44
End: 2024-04-08
Payer: COMMERCIAL

## 2024-04-08 DIAGNOSIS — R07.9 CHEST PAIN, UNSPECIFIED TYPE: ICD-10-CM

## 2024-04-08 PROCEDURE — 93010 ELECTROCARDIOGRAM REPORT: CPT | Performed by: INTERNAL MEDICINE

## 2024-04-08 PROCEDURE — 93005 ELECTROCARDIOGRAM TRACING: CPT

## 2024-04-08 NOTE — TELEPHONE ENCOUNTER
Pt states she needs a chemical stress test put in as she has a boot on her foot and can not do the treadmill. She says the one put in the system now is not correct so she can not get it scheduled.

## 2024-04-09 DIAGNOSIS — R07.9 CHEST PAIN, UNSPECIFIED TYPE: Primary | ICD-10-CM

## 2024-04-09 RX ORDER — DOBUTAMINE HYDROCHLORIDE 100 MG/100ML
5-40 INJECTION INTRAVENOUS CONTINUOUS
OUTPATIENT
Start: 2024-04-09

## 2024-04-09 RX ORDER — ATROPINE SULFATE 0.1 MG/ML
0.2 INJECTION INTRAVENOUS ONCE AS NEEDED
OUTPATIENT
Start: 2024-04-09

## 2024-04-09 NOTE — TELEPHONE ENCOUNTER
I put a new order in but we need to check with Laurie Moyer before we let the pt know we're all good.

## 2024-04-10 DIAGNOSIS — R07.9 CHEST PAIN, UNSPECIFIED TYPE: Primary | ICD-10-CM

## 2024-04-10 RX ORDER — REGADENOSON 0.08 MG/ML
0.4 INJECTION, SOLUTION INTRAVENOUS
OUTPATIENT
Start: 2024-04-10

## 2024-04-11 ENCOUNTER — HOSPITAL ENCOUNTER (OUTPATIENT)
Dept: RADIOLOGY | Facility: HOSPITAL | Age: 44
Discharge: HOME | End: 2024-04-11
Payer: COMMERCIAL

## 2024-04-11 ENCOUNTER — ANESTHESIA EVENT (OUTPATIENT)
Dept: OPERATING ROOM | Facility: HOSPITAL | Age: 44
End: 2024-04-11
Payer: COMMERCIAL

## 2024-04-11 ENCOUNTER — PRE-ADMISSION TESTING (OUTPATIENT)
Dept: PREADMISSION TESTING | Facility: HOSPITAL | Age: 44
End: 2024-04-11
Payer: COMMERCIAL

## 2024-04-11 ENCOUNTER — HOSPITAL ENCOUNTER (OUTPATIENT)
Dept: CARDIOLOGY | Facility: HOSPITAL | Age: 44
Discharge: HOME | End: 2024-04-11
Payer: COMMERCIAL

## 2024-04-11 VITALS
OXYGEN SATURATION: 99 % | DIASTOLIC BLOOD PRESSURE: 63 MMHG | TEMPERATURE: 98.3 F | HEART RATE: 64 BPM | RESPIRATION RATE: 16 BRPM | BODY MASS INDEX: 36.9 KG/M2 | SYSTOLIC BLOOD PRESSURE: 116 MMHG | WEIGHT: 215 LBS

## 2024-04-11 DIAGNOSIS — R07.9 CHEST PAIN, UNSPECIFIED TYPE: ICD-10-CM

## 2024-04-11 DIAGNOSIS — M96.89 DISORDER OF TENDON REPAIR: Primary | ICD-10-CM

## 2024-04-11 PROCEDURE — 93017 CV STRESS TEST TRACING ONLY: CPT

## 2024-04-11 PROCEDURE — 87081 CULTURE SCREEN ONLY: CPT | Mod: GENLAB

## 2024-04-11 PROCEDURE — A9502 TC99M TETROFOSMIN: HCPCS | Mod: SE | Performed by: FAMILY MEDICINE

## 2024-04-11 PROCEDURE — 97116 GAIT TRAINING THERAPY: CPT | Mod: GP

## 2024-04-11 PROCEDURE — 2500000004 HC RX 250 GENERAL PHARMACY W/ HCPCS (ALT 636 FOR OP/ED): Mod: SE | Performed by: FAMILY MEDICINE

## 2024-04-11 PROCEDURE — 78452 HT MUSCLE IMAGE SPECT MULT: CPT | Performed by: STUDENT IN AN ORGANIZED HEALTH CARE EDUCATION/TRAINING PROGRAM

## 2024-04-11 PROCEDURE — 78452 HT MUSCLE IMAGE SPECT MULT: CPT

## 2024-04-11 PROCEDURE — 3430000001 HC RX 343 DIAGNOSTIC RADIOPHARMACEUTICALS: Mod: SE | Performed by: FAMILY MEDICINE

## 2024-04-11 RX ORDER — DOCUSATE SODIUM 250 MG
250 CAPSULE ORAL DAILY
Status: ON HOLD | COMMUNITY
End: 2024-04-24

## 2024-04-11 RX ORDER — BUTYROSPERMUM PARKII(SHEA BUTTER), SIMMONDSIA CHINENSIS (JOJOBA) SEED OIL, ALOE BARBADENSIS LEAF EXTRACT .01; 1; 3.5 G/100G; G/100G; G/100G
250 LIQUID TOPICAL 2 TIMES DAILY
COMMUNITY

## 2024-04-11 RX ORDER — REGADENOSON 0.08 MG/ML
0.4 INJECTION, SOLUTION INTRAVENOUS ONCE
Status: COMPLETED | OUTPATIENT
Start: 2024-04-11 | End: 2024-04-11

## 2024-04-11 RX ADMIN — TETROFOSMIN 8.8 MILLICURIE: 0.23 INJECTION, POWDER, LYOPHILIZED, FOR SOLUTION INTRAVENOUS at 07:39

## 2024-04-11 RX ADMIN — TETROFOSMIN 29 MILLICURIE: 0.23 INJECTION, POWDER, LYOPHILIZED, FOR SOLUTION INTRAVENOUS at 08:55

## 2024-04-11 RX ADMIN — REGADENOSON 0.4 MG: 0.08 INJECTION, SOLUTION INTRAVENOUS at 08:53

## 2024-04-11 SDOH — HEALTH STABILITY: MENTAL HEALTH: CURRENT SMOKER: 0

## 2024-04-11 ASSESSMENT — PAIN - FUNCTIONAL ASSESSMENT: PAIN_FUNCTIONAL_ASSESSMENT: 0-10

## 2024-04-11 ASSESSMENT — DUKE ACTIVITY SCORE INDEX (DASI)
CAN YOU DO LIGHT WORK AROUND THE HOUSE LIKE DUSTING OR WASHING DISHES: YES
CAN YOU PARTICIPATE IN MODERATE RECREATIONAL ACTIVITIES LIKE GOLF, BOWLING, DANCING, DOUBLES TENNIS OR THROWING A BASEBALL OR FOOTBALL: NO
CAN YOU DO YARD WORK LIKE RAKING LEAVES, WEEDING OR PUSHING A MOWER: YES
CAN YOU CLIMB A FLIGHT OF STAIRS OR WALK UP A HILL: YES
CAN YOU RUN A SHORT DISTANCE: YES
CAN YOU PARTICIPATE IN STRENOUS SPORTS LIKE SWIMMING, SINGLES TENNIS, FOOTBALL, BASKETBALL, OR SKIING: NO
CAN YOU DO MODERATE WORK AROUND THE HOUSE LIKE VACUUMING, SWEEPING FLOORS OR CARRYING GROCERIES: YES
CAN YOU WALK A BLOCK OR TWO ON LEVEL GROUND: YES
TOTAL_SCORE: 44.7
CAN YOU HAVE SEXUAL RELATIONS: YES
CAN YOU TAKE CARE OF YOURSELF (EAT, DRESS, BATHE, OR USE TOILET): YES
CAN YOU WALK INDOORS, SUCH AS AROUND YOUR HOUSE: YES
CAN YOU DO HEAVY WORK AROUND THE HOUSE LIKE SCRUBBING FLOORS OR LIFTING AND MOVING HEAVY FURNITURE: YES
DASI METS SCORE: 8.2

## 2024-04-11 ASSESSMENT — PAIN SCALES - GENERAL: PAINLEVEL_OUTOF10: 0 - NO PAIN

## 2024-04-11 NOTE — PROGRESS NOTES
Physical Therapy                 Therapy Communication Note    Patient Name: Margarita Ortega  MRN: [id number]  Today's Date: 4/11/2024     Discipline: Physical Therapy    PAT PT gait training performed. Tx documentation in hard copy of chart.

## 2024-04-11 NOTE — PREPROCEDURE INSTRUCTIONS
Medication List            Accurate as of April 11, 2024 10:40 AM. Always use your most recent med list.                azelastine 137 mcg (0.1 %) nasal spray  Commonly known as: Astelin  Administer 1 spray into each nostril 2 times a day. Use in each nostril as directed  Medication Adjustments for Surgery: Take morning of surgery with sip of water, no other fluids     cetirizine 10 mg tablet  Commonly known as: ZyrTEC  Take 1 tablet (10 mg) by mouth once daily at bedtime. Takes during spring summer  Medication Adjustments for Surgery: Take morning of surgery with sip of water, no other fluids     docusate sodium 250 mg capsule  Medication Adjustments for Surgery: Take morning of surgery with sip of water, no other fluids     doxycycline 100 mg capsule  Commonly known as: Vibramycin  Take 1 capsule (100 mg) by mouth 2 times a day for 10 days. Take with at least 8 ounces (large glass) of water, do not lie down for 30 minutes after  Medication Adjustments for Surgery: Take morning of surgery with sip of water, no other fluids     Eye Itch Relief 0.025 % (0.035 %) ophthalmic solution  Generic drug: ketotifen  ADMINISTER ONE DROP IN THE AFFECTED EYE EVERY TWELVE HOURS AS NEEDED  Medication Adjustments for Surgery: Take morning of surgery with sip of water, no other fluids     fexofenadine 60 mg tablet  Commonly known as: Allegra  Medication Adjustments for Surgery: Take morning of surgery with sip of water, no other fluids     FLUoxetine 20 mg capsule  Commonly known as: PROzac  TAKE ONE CAPSULE BY MOUTH ONCE DAILY  Medication Adjustments for Surgery: Take morning of surgery with sip of water, no other fluids     mometasone 50 mcg/actuation nasal spray  Commonly known as: Nasonex  Medication Adjustments for Surgery: Take morning of surgery with sip of water, no other fluids     saccharomyces boulardii 250 mg capsule  Commonly known as: Florastor  Medication Adjustments for Surgery: Take morning of surgery with sip of  water, no other fluids     traZODone 100 mg tablet  Commonly known as: Desyrel  TAKE ONE TABLET BY MOUTH AT BEDTIME AS NEEDED FOR SLEEP  Medication Adjustments for Surgery: Take morning of surgery with sip of water, no other fluids                              NPO Instructions:    Do not eat any food after midnight the night before your surgery/procedure.  You may have clear liquids until TWO hours before surgery/procedure. This includes water, black tea/coffee, (no milk or cream) apple juice and electrolyte drinks (Gatorade).  You may chew gum up to TWO hours before your surgery/procedure.    Additional Instructions:     Review your medication instructions, take indicated medications  You may have clear liquids until TWO hours before surgery/procedure.  This includes water, black tea/coffee, (no milk or cream) apple juice and electrolyte drinks (Gatorade)  You may chew gum up to TWO hours before your surgery/procedure  Wear  comfortable loose fitting clothing  Do not use moisturizers, creams, lotions or perfume  All jewelry and valuables should be left at home    We will call you the business day before your procedure between 1-3p to confirm your procedure and arrival time  Please park in the back of the hospital, in the ED parking and proceed to the second floor  Please make arrangements to have a responsible adult take you home and stay with you for 24 hours  Please bring your ID and insurance card to your procedure  When checked in to the outpatient unit, you will be asked to change into a hospital gown and remove all clothing, including underwear  An IV will be inserted   Your family or  will be asked to wait in the waiting room or cafeteria and will be notified when able to come sit with you  Please call [phone] with any further questions

## 2024-04-11 NOTE — ANESTHESIA PREPROCEDURE EVALUATION
Patient: Margarita Ortega    Procedure Information       Date/Time: 04/24/24 1320    Procedures:       Repair Tendon Lower Extremity (Left)      Repair Ligament Ankle/Foot (Left)      Debridement Ankle (Left)    Location: [facility] / [facility]    Surgeons: Ryan Beltran DPM          Vitals:    04/24/24 1129   BP: 159/57   Pulse: 69   Resp: 16   Temp: 36.8 °C (98.2 °F)   SpO2: 94%       Past Surgical History:   Procedure Laterality Date   • CHOLECYSTECTOMY     • PARTIAL HYSTERECTOMY       Past Medical History:   Diagnosis Date   • Sleep apnea        Current Facility-Administered Medications:   •  ceFAZolin (Ancef) 2 g IV in dextrose 5% 50 mL, 2 g, intravenous, Once, Saroj Hinson PA-C  •  lactated Ringer's infusion, 30 mL/hr, intravenous, Continuous, Saroj Hinson PA-C, Last Rate: 30 mL/hr at 04/24/24 1142, 30 mL/hr at 04/24/24 1142  •  povidone-iodine 5 % kit kit, , Topical, Once, Saroj Hinson PA-C  Prior to Admission medications    Medication Sig Start Date End Date Taking? Authorizing Provider   azelastine (Astelin) 137 mcg (0.1 %) nasal spray Administer 1 spray into each nostril 2 times a day. Use in each nostril as directed 4/5/24 4/5/25 Yes Nicole Sanders,    cetirizine (ZyrTEC) 10 mg tablet Take 1 tablet (10 mg) by mouth once daily at bedtime. Takes during spring summer 7/17/23  Yes Nicole Sanders DO   chlorhexidine (Hibiclens) 4 % external liquid Apply to entire body focusing on surgical area, avoiding genitals, wash an rinse thoroughly for 5 days leading up to surgery 4/22/24  Yes Saroj Hinson PA-C   chlorhexidine (Peridex) 0.12 % solution Rinse with 15 mL the night before surgery and the morning of. 4/22/24  Yes Saroj Hinson PA-C   docusate sodium 250 mg capsule Take 250 capsules (62,500 mg) by mouth once daily.   Yes Historical Provider, MD   Eye Itch Relief 0.025 % (0.035 %) ophthalmic solution ADMINISTER ONE DROP IN THE AFFECTED EYE EVERY TWELVE HOURS AS NEEDED  "23  Yes Nicole Sanders DO   FLUoxetine (PROzac) 20 mg capsule TAKE ONE CAPSULE BY MOUTH ONCE DAILY 3/25/24  Yes Nicole Sanders DO   mometasone (Nasonex) 50 mcg/actuation nasal spray Administer 2 sprays into each nostril once daily. 22  Yes Historical Provider, MD   traZODone (Desyrel) 100 mg tablet TAKE ONE TABLET BY MOUTH AT BEDTIME AS NEEDED FOR SLEEP 3/25/24  Yes Nicole Sanders DO   saccharomyces boulardii (Florastor) 250 mg capsule Take 1 capsule (250 mg) by mouth 2 times a day.    Historical Provider, MD     Allergies   Allergen Reactions   • Midodrine Nausea/vomiting     Social History     Tobacco Use   • Smoking status: Former     Current packs/day: 0.00     Types: Cigarettes     Quit date:      Years since quittin.3   • Smokeless tobacco: Never   Substance Use Topics   • Alcohol use: Never         Chemistry    Lab Results   Component Value Date/Time     2024 0925    K 3.9 2024 0925     2024 0925    CO2 27 2024 0925    BUN 16 2024 0925    CREATININE 0.51 2024 0925    Lab Results   Component Value Date/Time    CALCIUM 9.3 2024 0925    ALKPHOS 55 2023 0902    AST 17 2023 0902    ALT 19 2023 0902    BILITOT 0.5 2023 0902          Lab Results   Component Value Date/Time    WBC 7.4 2024 0925    HGB 14.0 2024 0925    HCT 40.1 2024 0925     2024 0925     No results found for: \"PROTIME\", \"PTT\", \"INR\"  Encounter Date: 24   ECG 12 lead (Ancillary Performed)   Result Value    Ventricular Rate 62    Atrial Rate 62    HI Interval 174    QRS Duration 82    QT Interval 416    QTC Calculation(Bazett) 422    P Axis 71    R Axis 67    T Axis 47    QRS Count 10    Q Onset 222    P Onset 135    P Offset 185    T Offset 430    QTC Fredericia 420    Narrative    Normal sinus rhythm  Low voltage QRS  Cannot rule out Anterior infarct (cited on or before 2024)  Abnormal ECG  When " compared with ECG of 26-AUG-2022 16:53,  Previous ECG has undetermined rhythm, needs review  Confirmed by Luis F Lin ([id number]) on 4/14/2024 4:57:43 PM     No results found for this or any previous visit from the past 1095 days.    Relevant Problems   Anesthesia (within normal limits)      Cardiac   (+) Chest pain (Atypical, son passed away 8 months ago believes its stress related)   (+) Hypertriglyceridemia      Pulmonary (within normal limits)      Neuro   (+) Back pain with radiculopathy   (+) Carpal tunnel syndrome   (+) Depressive disorder      GI   (+) IBS (irritable bowel syndrome)      /Renal (within normal limits)      Liver (within normal limits)      Endocrine   (+) Obesity   (+) Prediabetes      Hematology (within normal limits)      Musculoskeletal   (+) Carpal tunnel syndrome      HEENT (within normal limits)      Skin (within normal limits)      GYN   (+) Menorrhagia   (+) Uterine fibroid      Social   (+) Stress at home       Clinical information reviewed:   Tobacco  Allergies  Meds   Med Hx  Surg Hx   Fam Hx  Soc Hx      Chart reviewed.  Stress test ordered by primary for atypical chest pain on 4/11/24, results pending.  All workup negative otherwise.  If stress test negative, will proceed without cardiac clearance ordered.    There were no vitals filed for this visit.    Past Surgical History:   Procedure Laterality Date   • CHOLECYSTECTOMY     • PARTIAL HYSTERECTOMY       History reviewed. No pertinent past medical history.    Current Outpatient Medications:   •  azelastine (Astelin) 137 mcg (0.1 %) nasal spray, Administer 1 spray into each nostril 2 times a day. Use in each nostril as directed, Disp: 30 mL, Rfl: 12  •  cetirizine (ZyrTEC) 10 mg tablet, Take 1 tablet (10 mg) by mouth once daily at bedtime. Takes during spring summer, Disp: 90 tablet, Rfl: 0  •  docusate sodium 250 mg capsule, Take 250 capsules (62,500 mg) by mouth once daily., Disp: , Rfl:   •  doxycycline  (Vibramycin) 100 mg capsule, Take 1 capsule (100 mg) by mouth 2 times a day for 10 days. Take with at least 8 ounces (large glass) of water, do not lie down for 30 minutes after, Disp: 20 capsule, Rfl: 0  •  Eye Itch Relief 0.025 % (0.035 %) ophthalmic solution, ADMINISTER ONE DROP IN THE AFFECTED EYE EVERY TWELVE HOURS AS NEEDED, Disp: 5 mL, Rfl: 0  •  fexofenadine (Allegra) 60 mg tablet, Take 1 tablet (60 mg) by mouth once daily., Disp: , Rfl:   •  FLUoxetine (PROzac) 20 mg capsule, TAKE ONE CAPSULE BY MOUTH ONCE DAILY, Disp: 30 capsule, Rfl: 5  •  mometasone (Nasonex) 50 mcg/actuation nasal spray, Administer 2 sprays into each nostril once daily., Disp: , Rfl:   •  saccharomyces boulardii (Florastor) 250 mg capsule, Take 1 capsule (250 mg) by mouth 2 times a day., Disp: , Rfl:   •  traZODone (Desyrel) 100 mg tablet, TAKE ONE TABLET BY MOUTH AT BEDTIME AS NEEDED FOR SLEEP, Disp: 30 tablet, Rfl: 1  No current facility-administered medications for this visit.  Prior to Admission medications    Medication Sig Start Date End Date Taking? Authorizing Provider   azelastine (Astelin) 137 mcg (0.1 %) nasal spray Administer 1 spray into each nostril 2 times a day. Use in each nostril as directed 4/5/24 4/5/25 Yes Nicole Sanders DO   cetirizine (ZyrTEC) 10 mg tablet Take 1 tablet (10 mg) by mouth once daily at bedtime. Takes during spring summer 7/17/23  Yes Nicole Sanders DO   docusate sodium 250 mg capsule Take 250 capsules (62,500 mg) by mouth once daily.   Yes Historical Provider, MD   doxycycline (Vibramycin) 100 mg capsule Take 1 capsule (100 mg) by mouth 2 times a day for 10 days. Take with at least 8 ounces (large glass) of water, do not lie down for 30 minutes after 4/5/24 4/15/24 Yes Nicole Sanders DO   Eye Itch Relief 0.025 % (0.035 %) ophthalmic solution ADMINISTER ONE DROP IN THE AFFECTED EYE EVERY TWELVE HOURS AS NEEDED 8/29/23  Yes Nicole Sanders DO   fexofenadine (Allegra) 60 mg tablet Take 1  "tablet (60 mg) by mouth once daily.   Yes Historical Provider, MD   FLUoxetine (PROzac) 20 mg capsule TAKE ONE CAPSULE BY MOUTH ONCE DAILY 3/25/24  Yes Nicole Sanders DO   mometasone (Nasonex) 50 mcg/actuation nasal spray Administer 2 sprays into each nostril once daily. 22  Yes Historical Provider, MD   saccharomyces boulardii (Florastor) 250 mg capsule Take 1 capsule (250 mg) by mouth 2 times a day.   Yes Historical Provider, MD   traZODone (Desyrel) 100 mg tablet TAKE ONE TABLET BY MOUTH AT BEDTIME AS NEEDED FOR SLEEP 3/25/24  Yes Nicole Sanders DO     Allergies   Allergen Reactions   • Midodrine Nausea/vomiting     Social History     Tobacco Use   • Smoking status: Former     Current packs/day: 0.00     Types: Cigarettes     Quit date:      Years since quittin.2   • Smokeless tobacco: Never   Substance Use Topics   • Alcohol use: Never         Chemistry    Lab Results   Component Value Date/Time     2024 0925    K 3.9 2024 0925     2024 0925    CO2 27 2024 0925    BUN 16 2024 0925    CREATININE 0.51 2024 0925    Lab Results   Component Value Date/Time    CALCIUM 9.3 2024 0925    ALKPHOS 55 2023 0902    AST 17 2023 0902    ALT 19 2023 0902    BILITOT 0.5 2023 0902          Lab Results   Component Value Date/Time    WBC 7.4 2024 0925    HGB 14.0 2024 0925    HCT 40.1 2024 0925     2024 0925     No results found for: \"PROTIME\", \"PTT\", \"INR\"  Encounter Date: 24   ECG 12 lead (Ancillary Performed)   Result Value    Ventricular Rate 62    Atrial Rate 62    CA Interval 174    QRS Duration 82    QT Interval 416    QTC Calculation(Bazett) 422    P Axis 71    R Axis 67    T Axis 47    QRS Count 10    Q Onset 222    P Onset 135    P Offset 185    T Offset 430    QTC Fredericia 420    Narrative    Normal sinus rhythm  Low voltage QRS  Cannot rule out Anterior infarct (cited on or before " 08-APR-2024)  Abnormal ECG  When compared with ECG of 26-AUG-2022 16:53,  Previous ECG has undetermined rhythm, needs review         NPO Detail:  No data recorded     Physical Exam    Airway  Mallampati: II  TM distance: >3 FB  Neck ROM: full     Cardiovascular - normal exam     Dental - normal exam     Pulmonary - normal exam     Abdominal - normal exam         Anesthesia Plan    History of general anesthesia?: yes  History of complications of general anesthesia?: unknown/emergency    ASA 3     general     The patient is not a current smoker.    intravenous induction   Postoperative administration of opioids is intended.  Anesthetic plan and risks discussed with patient.  Use of blood products discussed with patient who consented to blood products.    Plan discussed with CRNA.

## 2024-04-13 LAB — STAPHYLOCOCCUS SPEC CULT: NORMAL

## 2024-04-14 LAB
ATRIAL RATE: 62 BPM
P AXIS: 71 DEGREES
P OFFSET: 185 MS
P ONSET: 135 MS
PR INTERVAL: 174 MS
Q ONSET: 222 MS
QRS COUNT: 10 BEATS
QRS DURATION: 82 MS
QT INTERVAL: 416 MS
QTC CALCULATION(BAZETT): 422 MS
QTC FREDERICIA: 420 MS
R AXIS: 67 DEGREES
T AXIS: 47 DEGREES
T OFFSET: 430 MS
VENTRICULAR RATE: 62 BPM

## 2024-04-15 ENCOUNTER — APPOINTMENT (OUTPATIENT)
Dept: CARDIOLOGY | Facility: HOSPITAL | Age: 44
End: 2024-04-15
Payer: COMMERCIAL

## 2024-04-15 ENCOUNTER — APPOINTMENT (OUTPATIENT)
Dept: CARDIOLOGY | Facility: CLINIC | Age: 44
End: 2024-04-15
Payer: COMMERCIAL

## 2024-04-17 ENCOUNTER — APPOINTMENT (OUTPATIENT)
Dept: PREADMISSION TESTING | Facility: HOSPITAL | Age: 44
End: 2024-04-17
Payer: COMMERCIAL

## 2024-04-22 DIAGNOSIS — Z29.9 PROPHYLACTIC MEASURE: Primary | ICD-10-CM

## 2024-04-22 RX ORDER — CHLORHEXIDINE GLUCONATE ORAL RINSE 1.2 MG/ML
SOLUTION DENTAL
Qty: 120 ML | Refills: 0 | Status: SHIPPED | OUTPATIENT
Start: 2024-04-22 | End: 2024-04-24 | Stop reason: HOSPADM

## 2024-04-22 RX ORDER — CHLORHEXIDINE GLUCONATE 40 MG/ML
SOLUTION TOPICAL
Qty: 473 ML | Refills: 0 | Status: SHIPPED | OUTPATIENT
Start: 2024-04-22 | End: 2024-04-24 | Stop reason: HOSPADM

## 2024-04-24 ENCOUNTER — HOSPITAL ENCOUNTER (OUTPATIENT)
Facility: HOSPITAL | Age: 44
Setting detail: OUTPATIENT SURGERY
Discharge: HOME | End: 2024-04-24
Attending: PODIATRIST | Admitting: PODIATRIST
Payer: COMMERCIAL

## 2024-04-24 ENCOUNTER — APPOINTMENT (OUTPATIENT)
Dept: RADIOLOGY | Facility: HOSPITAL | Age: 44
End: 2024-04-24
Payer: COMMERCIAL

## 2024-04-24 ENCOUNTER — ANESTHESIA (OUTPATIENT)
Dept: OPERATING ROOM | Facility: HOSPITAL | Age: 44
End: 2024-04-24
Payer: COMMERCIAL

## 2024-04-24 VITALS
TEMPERATURE: 98.2 F | RESPIRATION RATE: 17 BRPM | WEIGHT: 215 LBS | HEART RATE: 75 BPM | HEIGHT: 65 IN | OXYGEN SATURATION: 95 % | DIASTOLIC BLOOD PRESSURE: 90 MMHG | SYSTOLIC BLOOD PRESSURE: 131 MMHG | BODY MASS INDEX: 35.82 KG/M2

## 2024-04-24 DIAGNOSIS — K58.1 IRRITABLE BOWEL SYNDROME WITH CONSTIPATION: Primary | ICD-10-CM

## 2024-04-24 DIAGNOSIS — S93.402A SPRAIN OF UNSPECIFIED LIGAMENT OF LEFT ANKLE, INITIAL ENCOUNTER: ICD-10-CM

## 2024-04-24 PROCEDURE — 2500000004 HC RX 250 GENERAL PHARMACY W/ HCPCS (ALT 636 FOR OP/ED): Mod: SE | Performed by: NURSE ANESTHETIST, CERTIFIED REGISTERED

## 2024-04-24 PROCEDURE — 7100000002 HC RECOVERY ROOM TIME - EACH INCREMENTAL 1 MINUTE: Performed by: PODIATRIST

## 2024-04-24 PROCEDURE — 2500000001 HC RX 250 WO HCPCS SELF ADMINISTERED DRUGS (ALT 637 FOR MEDICARE OP): Mod: SE | Performed by: PHYSICIAN ASSISTANT

## 2024-04-24 PROCEDURE — C1713 ANCHOR/SCREW BN/BN,TIS/BN: HCPCS | Performed by: PODIATRIST

## 2024-04-24 PROCEDURE — 2500000004 HC RX 250 GENERAL PHARMACY W/ HCPCS (ALT 636 FOR OP/ED): Mod: SE | Performed by: PHYSICIAN ASSISTANT

## 2024-04-24 PROCEDURE — 29898 ANKLE ARTHROSCOPY/SURGERY: CPT | Performed by: PHYSICIAN ASSISTANT

## 2024-04-24 PROCEDURE — 7100000001 HC RECOVERY ROOM TIME - INITIAL BASE CHARGE: Performed by: PODIATRIST

## 2024-04-24 PROCEDURE — 2720000007 HC OR 272 NO HCPCS: Performed by: PODIATRIST

## 2024-04-24 PROCEDURE — 2500000005 HC RX 250 GENERAL PHARMACY W/O HCPCS: Mod: SE | Performed by: PHYSICIAN ASSISTANT

## 2024-04-24 PROCEDURE — 2500000005 HC RX 250 GENERAL PHARMACY W/O HCPCS: Mod: SE

## 2024-04-24 PROCEDURE — 88304 TISSUE EXAM BY PATHOLOGIST: CPT | Performed by: PATHOLOGY

## 2024-04-24 PROCEDURE — 2500000005 HC RX 250 GENERAL PHARMACY W/O HCPCS: Mod: SE | Performed by: PODIATRIST

## 2024-04-24 PROCEDURE — 27658 REPAIR OF LEG TENDON EACH: CPT | Performed by: PHYSICIAN ASSISTANT

## 2024-04-24 PROCEDURE — 7100000010 HC PHASE TWO TIME - EACH INCREMENTAL 1 MINUTE: Performed by: PODIATRIST

## 2024-04-24 PROCEDURE — 88304 TISSUE EXAM BY PATHOLOGIST: CPT | Mod: TC | Performed by: PODIATRIST

## 2024-04-24 PROCEDURE — 2500000001 HC RX 250 WO HCPCS SELF ADMINISTERED DRUGS (ALT 637 FOR MEDICARE OP): Mod: SE | Performed by: PODIATRIST

## 2024-04-24 PROCEDURE — 3600000004 HC OR TIME - INITIAL BASE CHARGE - PROCEDURE LEVEL FOUR: Performed by: PODIATRIST

## 2024-04-24 PROCEDURE — 3600000009 HC OR TIME - EACH INCREMENTAL 1 MINUTE - PROCEDURE LEVEL FOUR: Performed by: PODIATRIST

## 2024-04-24 PROCEDURE — 2780000003 HC OR 278 NO HCPCS: Performed by: PODIATRIST

## 2024-04-24 PROCEDURE — 2500000004 HC RX 250 GENERAL PHARMACY W/ HCPCS (ALT 636 FOR OP/ED): Mod: SE

## 2024-04-24 PROCEDURE — 27695 REPAIR OF ANKLE LIGAMENT: CPT | Performed by: PHYSICIAN ASSISTANT

## 2024-04-24 PROCEDURE — 3700000001 HC GENERAL ANESTHESIA TIME - INITIAL BASE CHARGE: Performed by: PODIATRIST

## 2024-04-24 PROCEDURE — 7100000009 HC PHASE TWO TIME - INITIAL BASE CHARGE: Performed by: PODIATRIST

## 2024-04-24 PROCEDURE — 3700000002 HC GENERAL ANESTHESIA TIME - EACH INCREMENTAL 1 MINUTE: Performed by: PODIATRIST

## 2024-04-24 DEVICE — IMPLANTABLE DEVICE: Type: IMPLANTABLE DEVICE | Site: ANKLE | Status: FUNCTIONAL

## 2024-04-24 RX ORDER — FENTANYL CITRATE 50 UG/ML
INJECTION, SOLUTION INTRAMUSCULAR; INTRAVENOUS AS NEEDED
Status: DISCONTINUED | OUTPATIENT
Start: 2024-04-24 | End: 2024-04-24

## 2024-04-24 RX ORDER — BUPIVACAINE HYDROCHLORIDE AND EPINEPHRINE 2.5; 5 MG/ML; UG/ML
INJECTION, SOLUTION EPIDURAL; INFILTRATION; INTRACAUDAL; PERINEURAL AS NEEDED
Status: DISCONTINUED | OUTPATIENT
Start: 2024-04-24 | End: 2024-04-24 | Stop reason: HOSPADM

## 2024-04-24 RX ORDER — SCOLOPAMINE TRANSDERMAL SYSTEM 1 MG/1
PATCH, EXTENDED RELEASE TRANSDERMAL AS NEEDED
Status: DISCONTINUED | OUTPATIENT
Start: 2024-04-24 | End: 2024-04-24

## 2024-04-24 RX ORDER — ONDANSETRON HYDROCHLORIDE 2 MG/ML
INJECTION, SOLUTION INTRAVENOUS AS NEEDED
Status: DISCONTINUED | OUTPATIENT
Start: 2024-04-24 | End: 2024-04-24

## 2024-04-24 RX ORDER — OXYCODONE HYDROCHLORIDE 5 MG/1
5 TABLET ORAL EVERY 4 HOURS PRN
Status: DISCONTINUED | OUTPATIENT
Start: 2024-04-24 | End: 2024-04-24 | Stop reason: HOSPADM

## 2024-04-24 RX ORDER — BACITRACIN 500 [USP'U]/G
OINTMENT TOPICAL AS NEEDED
Status: DISCONTINUED | OUTPATIENT
Start: 2024-04-24 | End: 2024-04-24 | Stop reason: HOSPADM

## 2024-04-24 RX ORDER — CEFAZOLIN SODIUM 2 G/50ML
2 SOLUTION INTRAVENOUS ONCE
Status: COMPLETED | OUTPATIENT
Start: 2024-04-24 | End: 2024-04-24

## 2024-04-24 RX ORDER — GABAPENTIN 300 MG/1
600 CAPSULE ORAL ONCE
Status: COMPLETED | OUTPATIENT
Start: 2024-04-24 | End: 2024-04-24

## 2024-04-24 RX ORDER — LIDOCAINE HYDROCHLORIDE 20 MG/ML
INJECTION, SOLUTION INFILTRATION; PERINEURAL AS NEEDED
Status: DISCONTINUED | OUTPATIENT
Start: 2024-04-24 | End: 2024-04-24

## 2024-04-24 RX ORDER — OXYCODONE HYDROCHLORIDE 5 MG/1
10 TABLET ORAL EVERY 4 HOURS PRN
Status: DISCONTINUED | OUTPATIENT
Start: 2024-04-24 | End: 2024-04-24 | Stop reason: HOSPADM

## 2024-04-24 RX ORDER — SODIUM CHLORIDE, SODIUM LACTATE, POTASSIUM CHLORIDE, CALCIUM CHLORIDE 600; 310; 30; 20 MG/100ML; MG/100ML; MG/100ML; MG/100ML
30 INJECTION, SOLUTION INTRAVENOUS CONTINUOUS
Status: DISCONTINUED | OUTPATIENT
Start: 2024-04-24 | End: 2024-04-24 | Stop reason: HOSPADM

## 2024-04-24 RX ORDER — SODIUM CHLORIDE, SODIUM LACTATE, POTASSIUM CHLORIDE, CALCIUM CHLORIDE 600; 310; 30; 20 MG/100ML; MG/100ML; MG/100ML; MG/100ML
100 INJECTION, SOLUTION INTRAVENOUS CONTINUOUS
Status: DISCONTINUED | OUTPATIENT
Start: 2024-04-24 | End: 2024-04-24 | Stop reason: HOSPADM

## 2024-04-24 RX ORDER — DOCUSATE SODIUM 250 MG
250 CAPSULE ORAL DAILY
Qty: 30 CAPSULE | Refills: 0 | Status: SHIPPED | OUTPATIENT
Start: 2024-04-24 | End: 2024-05-24

## 2024-04-24 RX ORDER — ACETAMINOPHEN 325 MG/1
650 TABLET ORAL EVERY 4 HOURS PRN
Status: DISCONTINUED | OUTPATIENT
Start: 2024-04-24 | End: 2024-04-24 | Stop reason: HOSPADM

## 2024-04-24 RX ORDER — KETOROLAC TROMETHAMINE 30 MG/ML
INJECTION, SOLUTION INTRAMUSCULAR; INTRAVENOUS AS NEEDED
Status: DISCONTINUED | OUTPATIENT
Start: 2024-04-24 | End: 2024-04-24

## 2024-04-24 RX ORDER — ACETAMINOPHEN 325 MG/1
975 TABLET ORAL ONCE
Status: COMPLETED | OUTPATIENT
Start: 2024-04-24 | End: 2024-04-24

## 2024-04-24 RX ORDER — MIDAZOLAM HYDROCHLORIDE 1 MG/ML
INJECTION INTRAMUSCULAR; INTRAVENOUS AS NEEDED
Status: DISCONTINUED | OUTPATIENT
Start: 2024-04-24 | End: 2024-04-24

## 2024-04-24 RX ORDER — PROPOFOL 10 MG/ML
INJECTION, EMULSION INTRAVENOUS AS NEEDED
Status: DISCONTINUED | OUTPATIENT
Start: 2024-04-24 | End: 2024-04-24

## 2024-04-24 RX ORDER — ONDANSETRON HYDROCHLORIDE 2 MG/ML
4 INJECTION, SOLUTION INTRAVENOUS ONCE AS NEEDED
Status: DISCONTINUED | OUTPATIENT
Start: 2024-04-24 | End: 2024-04-24 | Stop reason: HOSPADM

## 2024-04-24 RX ADMIN — PROPOFOL 200 MG: 10 INJECTION, EMULSION INTRAVENOUS at 12:58

## 2024-04-24 RX ADMIN — ACETAMINOPHEN 975 MG: 325 TABLET ORAL at 11:38

## 2024-04-24 RX ADMIN — SODIUM CHLORIDE, POTASSIUM CHLORIDE, SODIUM LACTATE AND CALCIUM CHLORIDE 30 ML/HR: 600; 310; 30; 20 INJECTION, SOLUTION INTRAVENOUS at 11:42

## 2024-04-24 RX ADMIN — FENTANYL CITRATE 50 MCG: 50 INJECTION, SOLUTION INTRAMUSCULAR; INTRAVENOUS at 13:10

## 2024-04-24 RX ADMIN — ONDANSETRON 4 MG: 2 INJECTION, SOLUTION INTRAMUSCULAR; INTRAVENOUS at 14:20

## 2024-04-24 RX ADMIN — FENTANYL CITRATE 50 MCG: 50 INJECTION, SOLUTION INTRAMUSCULAR; INTRAVENOUS at 14:20

## 2024-04-24 RX ADMIN — GABAPENTIN 600 MG: 300 CAPSULE ORAL at 11:38

## 2024-04-24 RX ADMIN — HYDROMORPHONE HYDROCHLORIDE 0.5 MG: 1 INJECTION, SOLUTION INTRAMUSCULAR; INTRAVENOUS; SUBCUTANEOUS at 15:22

## 2024-04-24 RX ADMIN — HYDROMORPHONE HYDROCHLORIDE 0.5 MG: 1 INJECTION, SOLUTION INTRAMUSCULAR; INTRAVENOUS; SUBCUTANEOUS at 15:04

## 2024-04-24 RX ADMIN — POVIDONE-IODINE 1 APPLICATION: 5 SOLUTION TOPICAL at 12:52

## 2024-04-24 RX ADMIN — MIDAZOLAM HYDROCHLORIDE 2 MG: 1 INJECTION, SOLUTION INTRAMUSCULAR; INTRAVENOUS at 12:53

## 2024-04-24 RX ADMIN — CEFAZOLIN SODIUM 2 G: 2 SOLUTION INTRAVENOUS at 13:08

## 2024-04-24 RX ADMIN — OXYCODONE HYDROCHLORIDE 10 MG: 5 TABLET ORAL at 15:41

## 2024-04-24 RX ADMIN — LIDOCAINE HYDROCHLORIDE 100 MG: 20 INJECTION, SOLUTION INFILTRATION; PERINEURAL at 12:58

## 2024-04-24 RX ADMIN — KETOROLAC TROMETHAMINE 30 MG: 30 INJECTION, SOLUTION INTRAMUSCULAR; INTRAVENOUS at 14:20

## 2024-04-24 RX ADMIN — FENTANYL CITRATE 50 MCG: 50 INJECTION, SOLUTION INTRAMUSCULAR; INTRAVENOUS at 13:20

## 2024-04-24 RX ADMIN — FENTANYL CITRATE 25 MCG: 50 INJECTION, SOLUTION INTRAMUSCULAR; INTRAVENOUS at 13:35

## 2024-04-24 RX ADMIN — SCOPALAMINE 1 PATCH: 1 PATCH, EXTENDED RELEASE TRANSDERMAL at 12:45

## 2024-04-24 RX ADMIN — MIDAZOLAM HYDROCHLORIDE 2 MG: 1 INJECTION, SOLUTION INTRAMUSCULAR; INTRAVENOUS at 14:54

## 2024-04-24 RX ADMIN — DEXAMETHASONE SODIUM PHOSPHATE 4 MG: 4 INJECTION, SOLUTION INTRAMUSCULAR; INTRAVENOUS at 13:05

## 2024-04-24 RX ADMIN — FENTANYL CITRATE 25 MCG: 50 INJECTION, SOLUTION INTRAMUSCULAR; INTRAVENOUS at 14:01

## 2024-04-24 ASSESSMENT — PAIN SCALES - GENERAL
PAINLEVEL_OUTOF10: 2
PAINLEVEL_OUTOF10: 7
PAINLEVEL_OUTOF10: 5 - MODERATE PAIN
PAINLEVEL_OUTOF10: 3
PAINLEVEL_OUTOF10: 7
PAINLEVEL_OUTOF10: 2
PAINLEVEL_OUTOF10: 0 - NO PAIN
PAINLEVEL_OUTOF10: 0 - NO PAIN
PAIN_LEVEL: 0

## 2024-04-24 ASSESSMENT — PAIN - FUNCTIONAL ASSESSMENT
PAIN_FUNCTIONAL_ASSESSMENT: 0-10

## 2024-04-24 ASSESSMENT — ENCOUNTER SYMPTOMS
WOUND: 0
FEVER: 0
FATIGUE: 0
CHILLS: 0
JOINT SWELLING: 1
DIARRHEA: 0
SHORTNESS OF BREATH: 0
VOMITING: 0
DIAPHORESIS: 0
NAUSEA: 0

## 2024-04-24 ASSESSMENT — PAIN DESCRIPTION - LOCATION: LOCATION: ANKLE

## 2024-04-24 ASSESSMENT — COLUMBIA-SUICIDE SEVERITY RATING SCALE - C-SSRS
2. HAVE YOU ACTUALLY HAD ANY THOUGHTS OF KILLING YOURSELF?: NO
6. HAVE YOU EVER DONE ANYTHING, STARTED TO DO ANYTHING, OR PREPARED TO DO ANYTHING TO END YOUR LIFE?: NO
1. IN THE PAST MONTH, HAVE YOU WISHED YOU WERE DEAD OR WISHED YOU COULD GO TO SLEEP AND NOT WAKE UP?: NO

## 2024-04-24 ASSESSMENT — PAIN DESCRIPTION - DESCRIPTORS
DESCRIPTORS: ACHING
DESCRIPTORS: ACHING

## 2024-04-24 ASSESSMENT — PAIN DESCRIPTION - ORIENTATION: ORIENTATION: LEFT

## 2024-04-24 NOTE — OP NOTE
Repair Tendon Lower Extremity (L), Repair Ligament Ankle/Foot (L), Debridement Ankle (L) Operative Note     Date: 2024  OR Location: [facility]    Name: Margarita Ortega, : 1980, Age: 43 y.o., MRN: [id number], Sex: female    Diagnosis  Pre-op Diagnosis     * Sprain of unspecified ligament of left ankle, initial encounter [S93.402A] Post-op Diagnosis     * Sprain of unspecified ligament of left ankle, initial encounter [S93.402A]     Procedures  Repair Tendon Lower Extremity  98184 -left peroneal longus and left peroneal brevis tendon reconstruction repair repair Ligament Ankle/Foot  42370 extensive left ankle arthroscopic joint debridement  Debridement Ankle  36687 reconstruction of left lateral ankle ligament anterior talofibular ligament and calcaneofibular ligament  49696 stress films left ankle  00205 application posterior splint    Surgeons      * Ryan Beltran - Primary    Resident/Fellow/Other Assistant:  Surgeons and Role: Mani WAITEPRufinoMRufino 4.  Bacilio WAITEP.MRufino for  * No surgeons found with a matching role *    Procedure Summary  Anesthesia: * No anesthesia type entered *  ASA: III  Anesthesia Staff: CRNA: ALBERT Cavanaugh-CRNA  Estimated Blood Loss: 25 mL  Intra-op Medications:   Administrations occurring from 1230 to 1700 on 24:   Medication Name Total Dose   ceFAZolin (Ancef) 2 g IV in dextrose 5% 50 mL 2 g   povidone-iodine 5 % kit kit 1 Application              Anesthesia Record               Intraprocedure I/O Totals          Intake    Propofol Drip 0.00 mL    The total shown is the total volume documented since Anesthesia Start was filed.    ceFAZolin (Ancef) 2 g IV in dextrose 5% 50 mL 50.00 mL    Total Intake 50 mL          Specimen:   ID Type Source Tests Collected by Time   1 : left peroneal tendon Tissue TENDON/TENDON SHEATH SURGICAL PATHOLOGY EXAM Ryan Beltran DPM 2024 1489        Staff:   Circulator: Maddison Enriquez RN; Ramona Bedolla RN  Scrub Person:  Aysha Ramírez RN; Laurie Camarillo, NATTY         Drains and/or Catheters: * None in log *    Tourniquet Times: Left thigh tourniquet 250 mm as a pressure for approximately 70 minutes  * Missing tourniquet times found for documented tourniquets in lo *     Implants: ConMed 4.5 mm [place] anchor.  ConMed Dermagraft 4 x 8 cm sheet.  Implants       Type Name Action Serial No.      Implant INSTRUMENT KIT, NAINA, FULL THREAD, 4.5MM - [id number] Implanted      Implant ANCHOR SUTURE, HERCULES, FULL THREAD, 4.5 X 13.5M - [id number] Implanted      Graft ALLOGRAFT, DERMAL MATRIX, MESHED, 4 X 8CM, HYDRATED - [id number] Implanted               Findings: Severe torn peroneus longus tendon partial tear of peroneus longus tendon unstable ankle joint.  Moderate lateral ankle joint impingement syndrome    Indications: Margarita Ortega is an 43 y.o. female who is having surgery for Sprain of unspecified ligament of left ankle, initial encounter [S93.402A].  We reviewed indication risk and benefits of surgery patient has failed conservative options she elected surgical intervention and she understands all the risk of surgery.  No guarantees were given outcomes of surgery.    The patient was seen in the preoperative area. The risks, benefits, complications, treatment options, non-operative alternatives, expected recovery and outcomes were discussed with the patient. The possibilities of reaction to medication, pulmonary aspiration, injury to surrounding structures, bleeding, recurrent infection, the need for additional procedures, failure to diagnose a condition, and creating a complication requiring transfusion or operation were discussed with the patient. The patient concurred with the proposed plan, giving informed consent.  The site of surgery was properly noted/marked if necessary per policy. The patient has been actively warmed in preoperative area. Preoperative antibiotics have been ordered and given within 1 hours  of incision. Venous thrombosis prophylaxis have been ordered including unilateral sequential compression device    Procedure Details: Patient seen in preop holding.  Consent signed.  H&P completed.  IV antibiotics delivered.  Extremity was marked.  Reviewed indication risk and benefits with no guarantees given outcomes of surgery.    Patient brought to operative table.  Timeout performed.  Patient placed under general anesthesia.  IV antibiotics delivered.  Left thigh tourniquet applied.  Sterilely prepped and draped from the toes to the knee utilizing Betadine soap and paint.    We exsanguinated the leg with tourniquet up.  We did stress films and we showed inversion with talar tilt and positive anterior drawer test.    We now started her ankle scope.  Her anterior medial and anterolateral portals were placed accordingly medial to the tibialis anterior tendon and lateral to the peroneus tertius tendon and we avoided the batches of the superficial peroneal nerve.  We injected with 10 cc of saline solution with we then incised with a 15 blade opened with a hemostat and introduced to operate a cannula and small scope.    We spent about 15 to 20 minutes the scope in the ankle and it was noted that the cartilage was healthy but there was severe and significant discoid lesions, significant lateral ankle impingement syndrome with significant amount of impingement in the lateral gutter.  Medial gutter was intact and stable and not as severe as the lateral gutter.  Centrally the talar dome looked healthy and the inferior tibia looks healthy.    After extensive debriding the ankle removed her instrumentation    An incision was created along the peroneal tendons for a length of approximately 10 to 12 cm.  We incised through the anatomical planes and dissection levels.  We were able to open up the tendon sheath and a significant amount of fluid was expressed from the peroneal tendon sheath.  We now were able to mobilize the  peroneal tendon sheath and see that there was significant tears of the peroneus brevis tendon and low-lying muscle belly and there was significant fibrillation and loss of tubular shape of the peroneal brevis tendon.  There was slight disruption of the peroneus longus tendon.    We remove the portion of the unhealthy outside portion of the peroneal brevis tendon with a 15 blade sent this to pathology.  We remove the low-lying muscle belly of the peroneal brevis.  We then did a modified whipstitch utilizing a 3-0 Prolene suture to tubularized the peroneal brevis tendon.  We used a 18-gauge needle to fenestrate both the longus and brevis tendon and 3-0 Vicryl suture was used to repair part of the peroneus longus tendon.    We now able to blunt dissect over the anterior lateral aspect of the ankle joint.  We now able to see the disrupted extensor retinaculum and ATF ligament.  Also were able to retract the peroneal longus and peroneal brevis tendon and visualize the calcaneofibular ligament which also seem to be attenuated.  A small section of the ligaments were removed approximately 1-1/2 cm.  We held the foot in neutral position we utilized a [employer] [place] anchor and were able to do a pants over vest repair of the lateral collateral ligaments with great stability.    Utilizing the med allo dermmatrix 4x8 centimeter cath was used part of this was placed over the repair of the ligaments and the remaining was wrapped around the peroneal longus and peroneal brevis tendon.  We let the tourniquet down control bleeding with the Bovie.  We injected 0.5% Marcaine with epinephrine.  Repaired the sheath with 3-0 and 2-0 Vicryl suture.  Closed the subcu tissue with 3-0 Vicryl suture and closed the skin with 3-0 and 2-0 Prolene suture in a horizontal mattress and simple interrupted pattern.  A sterile dressing was applied a posterior splint was applied.  Toes are pink and healthy.  Patient tolerated procedure anesthesia well.   Vital signs stable.  Neurovascular intact.  Patient was discharged home with written and verbal instructions.  If the patient has any problems they will contact me medications were sent to pharmacy.  Complications:  None; patient tolerated the procedure well.    Disposition: PACU - hemodynamically stable.  Condition: stable         Additional Details: No additional detail    Attending Attestation:     Ryan Beltran  Phone Number: [phone]

## 2024-04-24 NOTE — ANESTHESIA POSTPROCEDURE EVALUATION
Patient: Margarita Ortega    Procedure Summary       Date: 04/24/24 Room / Location: [facility] 03 / [facility]    Anesthesia Start: 1255 Anesthesia Stop: 1504    Procedures:       Repair Tendon Lower Extremity (Left: Ankle)      Repair Ligament Ankle/Foot (Left: Ankle)      Debridement Ankle (Left: Ankle) Diagnosis:       Sprain of unspecified ligament of left ankle, initial encounter      (Sprain of unspecified ligament of left ankle, initial encounter [S93.402A])    Surgeons: Ryan Beltran DPM Responsible Provider: CARMEN Cavanaugh    Anesthesia Type: general ASA Status: 3            Anesthesia Type: general    Vitals Value Taken Time   /84 04/24/24 1502   Temp 36 04/24/24 1506   Pulse 97 04/24/24 1502   Resp 17 04/24/24 1502   SpO2 96 % 04/24/24 1457       Anesthesia Post Evaluation    Patient location during evaluation: PACU  Patient participation: complete - patient participated  Level of consciousness: anxious and awake  Pain score: 0  Pain management: adequate  Multimodal analgesia pain management approach  Airway patency: patent  Two or more strategies used to mitigate risk of obstructive sleep apnea  Cardiovascular status: acceptable  Respiratory status: acceptable and room air  Hydration status: acceptable  Postoperative Nausea and Vomiting: none        There were no known notable events for this encounter.

## 2024-04-24 NOTE — LETTER
April 24, 2024     Patient: Margarita Ortega   YOB: 1980   Date of Visit: 4/24/24       To Whom It May Concern:    Christine was seen for surgery today, 4/24/24, with Dr. Ryan Beltran. It is my medical opinion that Margarita Ortega should remain out of work until 4/29/24 when she has her follow-up appointment . Dr. Beltran will assess for further time off if needed.    If you have any questions or concerns, please don't hesitate to call. [phone].         Sincerely,          Jaclyn Obando PA-C    CC: No Recipients

## 2024-04-24 NOTE — ANESTHESIA PROCEDURE NOTES
Airway  Date/Time: 4/24/2024 1:00 PM  Urgency: elective    Airway not difficult    Staffing  Performed: CRNA   Authorized by: CARMEN Cavanaugh    Performed by: CARMEN Cavanaugh  Patient location during procedure: OR    Indications and Patient Condition  Indications for airway management: anesthesia and airway protection  Spontaneous Ventilation: absent  Sedation level: deep  Preoxygenated: yes  Patient position: sniffing  MILS maintained throughout  Mask difficulty assessment: 1 - vent by mask  Planned trial extubation    Final Airway Details  Final airway type: supraglottic airway      Successful airway: Supraglottic airway: i-gel.  Size 3     Number of attempts at approach: 1  Number of other approaches attempted: 0

## 2024-04-24 NOTE — H&P
History Of Present Illness  Margarita Ortega is a 43 y.o. female presenting with left ankle ligament sprain. She is here for left repair tendon lower extremity, left repair ligament ankle/foot, left debridement ankle. She was last seen by Dr. Beltran sometime in March. Originally her procedure was scheduled for 4/10. States her injury was not clear but she is very active at work and went into the ED on 1/30/24 for left ankle pain and swelling. She was deferred to Dr. Beltran and eventually got an MRI revealing a segmental split type tear of the peroneus brevis tendon. States she has been in a walking boot for about 3 months. No significant changes since she was last seen. Has prescriptions for after surgery. Had crutch training with PT at [facility]. Denies fever, chills, SOB, CP, n/v/d.      Past Medical History  No past medical history on file.    Surgical History  Past Surgical History:   Procedure Laterality Date    CHOLECYSTECTOMY      PARTIAL HYSTERECTOMY          Social History  She reports that she quit smoking about 24 years ago. Her smoking use included cigarettes. She has never used smokeless tobacco. She reports that she does not drink alcohol and does not use drugs.    Family History  Family History   Problem Relation Name Age of Onset    Breast cancer Father's Sister          age unknown        Allergies  Midodrine    Review of Systems   Constitutional:  Negative for chills, diaphoresis, fatigue and fever.   HENT: Negative.     Respiratory:  Negative for shortness of breath.    Cardiovascular:  Negative for chest pain.   Gastrointestinal:  Negative for diarrhea, nausea and vomiting.   Genitourinary: Negative.    Musculoskeletal:  Positive for gait problem and joint swelling.   Skin:  Negative for pallor, rash and wound.        Physical Exam  Constitutional:       General: She is not in acute distress.     Appearance: Normal appearance.   HENT:      Head: Normocephalic.      Mouth/Throat:      Mouth: Mucous  membranes are moist.   Eyes:      General: No scleral icterus.     Conjunctiva/sclera: Conjunctivae normal.      Pupils: Pupils are equal, round, and reactive to light.   Cardiovascular:      Rate and Rhythm: Normal rate and regular rhythm.      Pulses: Normal pulses.      Heart sounds: Normal heart sounds.   Pulmonary:      Effort: Pulmonary effort is normal. No respiratory distress.      Breath sounds: Normal breath sounds.   Musculoskeletal:         General: Signs of injury present.      Right lower leg: No edema.      Left lower leg: No edema.      Comments: Impaired rotation of left ankle   Skin:     General: Skin is warm and dry.   Neurological:      General: No focal deficit present.      Mental Status: She is alert and oriented to person, place, and time. Mental status is at baseline.   Psychiatric:         Mood and Affect: Mood normal.          Last Recorded Vitals  Last menstrual period 05/11/2023.    Relevant Results   Latest Reference Range & Units 04/05/24 09:25 04/08/24 13:17 04/11/24 09:20 04/11/24 10:33 04/11/24 11:24   GLUCOSE 74 - 99 mg/dL 129 (H)       SODIUM 136 - 145 mmol/L 138       POTASSIUM 3.5 - 5.3 mmol/L 3.9       CHLORIDE 98 - 107 mmol/L 105       Bicarbonate 21 - 32 mmol/L 27       Anion Gap 10 - 20 mmol/L 10       Blood Urea Nitrogen 6 - 23 mg/dL 16       Creatinine 0.50 - 1.05 mg/dL 0.51       EGFR >60 mL/min/1.73m*2 >90       Calcium 8.6 - 10.3 mg/dL 9.3       Hemoglobin A1C see below % 5.4       Estimated Average Glucose Not Established mg/dL 108       LEUKOCYTES (10*3/UL) IN BLOOD BY AUTOMATED COUNT, [place] 4.4 - 11.3 x10*3/uL 7.4       nRBC 0.0 - 0.0 /100 WBCs 0.0       ERYTHROCYTES (10*6/UL) IN BLOOD BY AUTOMATED COUNT, [place] 4.00 - 5.20 x10*6/uL 4.76       HEMOGLOBIN 12.0 - 16.0 g/dL 14.0       HEMATOCRIT 36.0 - 46.0 % 40.1       MCV 80 - 100 fL 84       MCH 26.0 - 34.0 pg 29.4       MCHC 32.0 - 36.0 g/dL 34.9       RED CELL DISTRIBUTION WIDTH 11.5 - 14.5 % 12.6        PLATELETS (10*3/UL) IN BLOOD AUTOMATED COUNT, [place] 150 - 450 x10*3/uL 192       NEUTROPHILS/100 LEUKOCYTES IN BLOOD BY AUTOMATED COUNT, [place] 40.0 - 80.0 % 73.7       Immature Granulocytes %, Automated 0.0 - 0.9 % 0.5       Lymphocytes % 13.0 - 44.0 % 19.2       Monocytes % 2.0 - 10.0 % 5.5       Eosinophils % 0.0 - 6.0 % 0.7       Basophils % 0.0 - 2.0 % 0.4       NEUTROPHILS (10*3/UL) IN BLOOD BY AUTOMATED COUNT, [place] 1.20 - 7.70 x10*3/uL 5.44       Immature Granulocytes Absolute, Automated 0.00 - 0.70 x10*3/uL 0.04       Lymphocytes Absolute 1.20 - 4.80 x10*3/uL 1.42       Monocytes Absolute 0.10 - 1.00 x10*3/uL 0.41       Eosinophils Absolute 0.00 - 0.70 x10*3/uL 0.05       Basophils Absolute 0.00 - 0.10 x10*3/uL 0.03       STAPHYLOCOCCUS AUREUS/MRSA COLONIZATION, CULTURE      Rpt   Color, Urine Straw, Yellow  Yellow       Appearance, Urine Clear  Clear       Specific Gravity, Urine 1.005 - 1.035  1.022       pH, Urine 5.0, 5.5, 6.0, 6.5, 7.0, 7.5, 8.0  5.0       Protein, Urine NEGATIVE mg/dL NEGATIVE       Glucose, Urine NEGATIVE mg/dL >=500 (3+) !       Blood, Urine NEGATIVE  NEGATIVE       Ketones, Urine NEGATIVE mg/dL NEGATIVE       Bilirubin, Urine NEGATIVE  NEGATIVE       Urobilinogen, Urine <2.0 mg/dL <2.0       Nitrite, Urine NEGATIVE  NEGATIVE       Leukocyte Esterase, Urine NEGATIVE  NEGATIVE       NUCLEAR STRESS TEST     Rpt    ECG 12-LEAD   Rpt      CARDIOLOGY INTERPRETATION OF NUCLEAR STRESS    Rpt     P [place] degrees  71      ME Interval ms  174      QT Interval ms  416      (H): Data is abnormally high  !: Data is abnormal  Rpt: View report in Results Review for more information     Assessment/Plan   Left ankle ligament sprain, initial encounter  Left repair tendon lower extremity, left repair ligament ankle/foot, left debridement ankle       I spent 35 minutes in the professional and overall care of this patient.      Jaclyn Obando PA-C

## 2024-04-25 ASSESSMENT — PAIN SCALES - GENERAL: PAINLEVEL_OUTOF10: 6

## 2024-04-29 LAB
LABORATORY COMMENT REPORT: NORMAL
PATH REPORT.FINAL DX SPEC: NORMAL
PATH REPORT.GROSS SPEC: NORMAL
PATH REPORT.RELEVANT HX SPEC: NORMAL
PATH REPORT.TOTAL CANCER: NORMAL

## 2024-05-22 DIAGNOSIS — F51.01 PRIMARY INSOMNIA: ICD-10-CM

## 2024-05-22 RX ORDER — TRAZODONE HYDROCHLORIDE 100 MG/1
100 TABLET ORAL NIGHTLY PRN
Qty: 30 TABLET | Refills: 1 | Status: SHIPPED | OUTPATIENT
Start: 2024-05-22

## 2024-05-30 DIAGNOSIS — H57.9 ITCHY, WATERY, AND RED EYE: ICD-10-CM

## 2024-05-30 RX ORDER — KETOTIFEN FUMARATE 0.35 MG/ML
SOLUTION/ DROPS OPHTHALMIC
Qty: 5 ML | Refills: 3 | Status: SHIPPED | OUTPATIENT
Start: 2024-05-30

## 2024-06-12 ENCOUNTER — EVALUATION (OUTPATIENT)
Dept: PHYSICAL THERAPY | Facility: HOSPITAL | Age: 44
End: 2024-06-12
Payer: COMMERCIAL

## 2024-06-12 DIAGNOSIS — M76.72 PERONEAL TENDINITIS, LEFT LEG: ICD-10-CM

## 2024-06-12 DIAGNOSIS — R26.2 DIFFICULTY WALKING: Primary | ICD-10-CM

## 2024-06-12 PROCEDURE — 97162 PT EVAL MOD COMPLEX 30 MIN: CPT | Mod: GP

## 2024-06-12 PROCEDURE — 97110 THERAPEUTIC EXERCISES: CPT | Mod: GP

## 2024-06-12 ASSESSMENT — ACTIVITIES OF DAILY LIVING (ADL): EFFECT OF PAIN ON DAILY ACTIVITIES: DECREASED AMBULATION

## 2024-06-12 ASSESSMENT — PAIN - FUNCTIONAL ASSESSMENT: PAIN_FUNCTIONAL_ASSESSMENT: 0-10

## 2024-06-12 ASSESSMENT — ENCOUNTER SYMPTOMS
LOSS OF SENSATION IN FEET: 0
OCCASIONAL FEELINGS OF UNSTEADINESS: 0
DEPRESSION: 0

## 2024-06-12 ASSESSMENT — PAIN SCALES - GENERAL: PAINLEVEL_OUTOF10: 4

## 2024-06-12 NOTE — PROGRESS NOTES
Physical Therapy    Physical Therapy Evaluation and Treatment      Patient Name: Margarita Ortega  MRN: [id number]  Today's Date: 6/12/2024  Time Calculation  Start Time: 1100  Stop Time: 1145  Time Calculation (min): 45 min    Assessment:  PT Assessment  Rehab Prognosis: Good     Plan:  OP PT Plan  Treatment/Interventions: Education/ Instruction, Gait training, Neuromuscular re-education, Therapeutic activities, Therapeutic exercises  PT Plan: Skilled PT  PT Frequency: 2 times per week  Duration: 6 wks  Number of Treatments Authorized: awaiting auth  Rehab Potential: Good  Plan of Care Agreement: Patient    Current Problem:   1. Difficulty walking        2. Peroneal tendinitis, left leg  Referral to Physical Therapy          Subjective    General:  General  Reason for Referral: Eval and treat 2/2 L peroneal tendon repair.  Referred By: Dr. Beltran  Past Medical History Relevant to Rehab: Patient had non traumatic tear of L peroneal tendon repaired 4/24/2024 with boot immobilization and NWB status until most recent ortho check up. She has now been asked to wean fro mboot and begin PT.  General Comment: Attempted to amb without boot but was too painful as of yet.    Precautions:  Precautions  STEADI Fall Risk Score (The score of 4 or more indicates an increased risk of falling): 2  LE Weight Bearing Status: Weight Bearing as Tolerated    Pain:  Pain Assessment  Pain Assessment: 0-10  Pain Score: 4  Pain Location: Ankle  Pain Orientation: Left  Effect of Pain on Daily Activities: decreased ambulation    Prior Level of Function:  Prior Function Per Pt/Caregiver Report  Vocational: Full time employment    Objective   General Assessments:     Range of Motion Comments: L ankle: DF 2, PF 45, I 12, E 2    Strength Comments: 4/5 DF and PF, 4-/5 Inversion/eversion.       Functional Assessments:  Gait Comment: Amb to dept without devices in orthoboot.    Extremity/Trunk Assessments:    ANKLE    Functional Rating Scale      Observation   Well heeling lateral ankle incision    Ankle Palpation/Joint Mobility Assessment   NTTP    Ankle MMT   See above    Gait   Instructed in use of cane to aide in weaning from ortho boot    Outcome Measures:   TU sec   LEFS 34      Treatments:  Therapeutic Exercise  Therapeutic Exercise Performed: Yes  Therapeutic Exercise Activity 1: AROM DF/PF  Therapeutic Exercise Activity 2: inv/ever  Therapeutic Exercise Activity 3: circles  Therapeutic Exercise Activity 4: seated toe raise  Therapeutic Exercise Activity 5: seated heel raise  Therapeutic Exercise Activity 6: DF/PF slides  Therapeutic Exercise Activity 7: inv/ever slides.    EDUCATION:  Outpatient Education  Individual(s) Educated: Patient  Education Provided: Anatomy, Body Mechanics, POC, Home Exercise Program, Other (Gait with straight cane.)  Patient Response to Education: Patient/Caregiver Verbalized Understanding of Information    Goals:  Active       PT Problem       The patient will demonstrate full understanding and competent performance of their home exercise program to maintain or potentially further improve their presenting condition.        Start:  24    Expected End:  24            The patient will ambulate 10 minutes continuously without pain increasing to greater than 1/10 to allow return to required community ambulation tasks.        Start:  24    Expected End:  24            The patient will decrease their time in the timed up and go to <15 seconds to demonstrate improved balance and functional ambulatory ability.        Start:  24    Expected End:  24            Increase LEFS to >=60       Start:  24    Expected End:  24            Increase L ankle AROM to >= 10 dorsiflexion, 50 plantarflexion, 25 inversion and 20 eversion with 5/5 MMT.       Start:  24    Expected End:  24

## 2024-06-18 ENCOUNTER — TREATMENT (OUTPATIENT)
Dept: PHYSICAL THERAPY | Facility: HOSPITAL | Age: 44
End: 2024-06-18
Payer: COMMERCIAL

## 2024-06-18 DIAGNOSIS — R26.2 DIFFICULTY WALKING: ICD-10-CM

## 2024-06-18 PROCEDURE — 97110 THERAPEUTIC EXERCISES: CPT | Mod: GP

## 2024-06-18 ASSESSMENT — PAIN SCALES - GENERAL: PAINLEVEL_OUTOF10: 1

## 2024-06-18 ASSESSMENT — PAIN - FUNCTIONAL ASSESSMENT: PAIN_FUNCTIONAL_ASSESSMENT: 0-10

## 2024-06-18 NOTE — PROGRESS NOTES
Physical Therapy    Physical Therapy Treatment    Patient Name: Margarita Ortega  MRN: [id number]    Today's Date: 6/18/2024  Time Calculation  Start Time: 0820  Stop Time: 0900  Time Calculation (min): 40 min     PT Therapeutic Procedures Time Entry  Therapeutic Exercise Time Entry: 40     Assessment:  PT Assessment  Assessment Comment: advanced program today with good tolerance.    Plan:  OP PT Plan  PT Plan: Skilled PT (2/12)    Current Problem  1. Difficulty walking  Follow Up In Physical Therapy          General  PT  Visit  PT Received On: 06/18/24  General  General Comment: Patient able to wean from boot over weekend with minimal pain. Using cane for longer distances.    Subjective    Pain  Pain Assessment  Pain Assessment: 0-10  Pain Score: 1  Pain Location: Ankle  Pain Orientation: Left    Objective     Activity Tolerance:  Activity Tolerance  Activity Tolerance Comments: minimal discomfort which resolves post activity.    Treatments:  Therapeutic Exercise  Therapeutic Exercise Performed: Yes  Therapeutic Exercise Activity 1: Pro2 x12  Therapeutic Exercise Activity 2: AROM:DF/PF  Therapeutic Exercise Activity 3: inv/ever  Therapeutic Exercise Activity 4: circles each direction  Therapeutic Exercise Activity 5: seated calf (2 inch step)  Therapeutic Exercise Activity 6: seated dorsiflexion  Therapeutic Exercise Activity 7: DF/PF slides  Therapeutic Exercise Activity 8: inv/ever slides  Therapeutic Exercise Activity 9: inversion isometrics 1x10x5 sec  Therapeutic Exercise Activity 10: eversion isometrics 1x10x5 sec.  Therapeutic Exercise Activity 11: calf raise in // bars 3x10    OP EDUCATION:   Advanced program    Goals:  Active       PT Problem       The patient will demonstrate full understanding and competent performance of their home exercise program to maintain or potentially further improve their presenting condition.        Start:  06/12/24    Expected End:  07/24/24            The patient will ambulate 10  minutes continuously without pain increasing to greater than 1/10 to allow return to required community ambulation tasks.        Start:  06/12/24    Expected End:  07/24/24            The patient will decrease their time in the timed up and go to <15 seconds to demonstrate improved balance and functional ambulatory ability.        Start:  06/12/24    Expected End:  07/24/24            Increase LEFS to >=60       Start:  06/12/24    Expected End:  07/24/24            Increase L ankle AROM to >= 10 dorsiflexion, 50 plantarflexion, 25 inversion and 20 eversion with 5/5 MMT.       Start:  06/12/24    Expected End:  07/24/24

## 2024-06-20 ENCOUNTER — TREATMENT (OUTPATIENT)
Dept: PHYSICAL THERAPY | Facility: HOSPITAL | Age: 44
End: 2024-06-20
Payer: COMMERCIAL

## 2024-06-20 DIAGNOSIS — R26.2 DIFFICULTY WALKING: ICD-10-CM

## 2024-06-20 PROCEDURE — 97110 THERAPEUTIC EXERCISES: CPT | Mod: GP

## 2024-06-20 ASSESSMENT — PAIN SCALES - GENERAL: PAINLEVEL_OUTOF10: 4

## 2024-06-20 ASSESSMENT — PAIN - FUNCTIONAL ASSESSMENT: PAIN_FUNCTIONAL_ASSESSMENT: 0-10

## 2024-06-20 NOTE — PROGRESS NOTES
Physical Therapy    Physical Therapy Treatment    Patient Name: Margarita Ortega  MRN: [id number]    Today's Date: 6/20/2024  Time Calculation  Start Time: 1430  Stop Time: 1514  Time Calculation (min): 44 min     PT Therapeutic Procedures Time Entry  Therapeutic Exercise Time Entry: 44     Assessment:  PT Assessment  Assessment Comment: Progressing activities modestly with good tolerance.    Plan:  OP PT Plan  PT Plan: Skilled PT (3/12)    Current Problem  1. Difficulty walking  Follow Up In Physical Therapy          General  PT  Visit  PT Received On: 06/20/24  General  General Comment: Patient having some increased soreness today 2/2 on feet a lot at work.    Subjective    Pain  Pain Assessment  Pain Assessment: 0-10  0-10 (Numeric) Pain Score: 4    Objective   Activity Tolerance:  Activity Tolerance  Activity Tolerance Comments: No pain post session while at rest.    Treatments:  Therapeutic Exercise  Therapeutic Exercise Performed: Yes  Therapeutic Exercise Activity 1: Pro2 x12  Therapeutic Exercise Activity 2: OC ankle AROM (4)  Therapeutic Exercise Activity 3: Seated calf raise [id number]  Therapeutic Exercise Activity 4: Seated dorsiflexion 3x10  Therapeutic Exercise Activity 5: inversion isometrics 3x10  Therapeutic Exercise Activity 6: eversion yellow TB isometrics (hip abduction to create tension) 3x10  Therapeutic Exercise Activity 7: standing calf raise 3x10 (wt. biased to R 75/25)    OP EDUCATION:       Goals:  Active       PT Problem       The patient will demonstrate full understanding and competent performance of their home exercise program to maintain or potentially further improve their presenting condition.        Start:  06/12/24    Expected End:  07/24/24            The patient will ambulate 10 minutes continuously without pain increasing to greater than 1/10 to allow return to required community ambulation tasks.        Start:  06/12/24    Expected End:  07/24/24            The patient will decrease  their time in the timed up and go to <15 seconds to demonstrate improved balance and functional ambulatory ability.        Start:  06/12/24    Expected End:  07/24/24            Increase LEFS to >=60       Start:  06/12/24    Expected End:  07/24/24            Increase L ankle AROM to >= 10 dorsiflexion, 50 plantarflexion, 25 inversion and 20 eversion with 5/5 MMT.       Start:  06/12/24    Expected End:  07/24/24

## 2024-06-25 ENCOUNTER — TREATMENT (OUTPATIENT)
Dept: PHYSICAL THERAPY | Facility: HOSPITAL | Age: 44
End: 2024-06-25
Payer: COMMERCIAL

## 2024-06-25 DIAGNOSIS — R26.2 DIFFICULTY WALKING: ICD-10-CM

## 2024-06-25 PROCEDURE — 97110 THERAPEUTIC EXERCISES: CPT | Mod: GP

## 2024-06-25 ASSESSMENT — PAIN - FUNCTIONAL ASSESSMENT: PAIN_FUNCTIONAL_ASSESSMENT: 0-10

## 2024-06-25 ASSESSMENT — PAIN SCALES - GENERAL: PAINLEVEL_OUTOF10: 2

## 2024-06-25 NOTE — PROGRESS NOTES
Physical Therapy    Physical Therapy Treatment    Patient Name: Margarita Ortega  MRN: [id number]    Today's Date: 6/25/2024  Time Calculation  Start Time: 1428  Stop Time: 1511  Time Calculation (min): 43 min     PT Therapeutic Procedures Time Entry  Therapeutic Exercise Time Entry: 43     Assessment:  PT Assessment  Assessment Comment: Progressing activity slowly 2/2 volume of activity outside therapy.    Plan:  OP PT Plan  PT Plan: Skilled PT (4/12)    Current Problem  1. Difficulty walking  Follow Up In Physical Therapy          General  PT  Visit  PT Received On: 06/25/24  General  General Comment: Sore again 2/2 cooking full meal with a lot of standing. Also had a busy work day (up on feet a lot).    Subjective    Pain  Pain Assessment  Pain Assessment: 0-10  0-10 (Numeric) Pain Score: 2    Objective   Activity Tolerance:  Activity Tolerance  Activity Tolerance Comments: Requires careful approach 2/2 apprehension related to busy day at work.    Treatments:  Therapeutic Exercise  Therapeutic Exercise Performed: Yes  Therapeutic Exercise Activity 1: Pro2 x12  Therapeutic Exercise Activity 2: CC AROM 3x30 each in // bars  Therapeutic Exercise Activity 3: band iso eversion 3x10  Therapeutic Exercise Activity 4: isometric inversion 3x10  Therapeutic Exercise Activity 5: seated calf raise [id number]  Therapeutic Exercise Activity 6: standing calf raise 3x10  Therapeutic Exercise Activity 7: standing gastroc/soleus complex stretch 3x30 sec each         OP EDUCATION:       Goals:  Active       PT Problem       The patient will demonstrate full understanding and competent performance of their home exercise program to maintain or potentially further improve their presenting condition.        Start:  06/12/24    Expected End:  07/24/24            The patient will ambulate 10 minutes continuously without pain increasing to greater than 1/10 to allow return to required community ambulation tasks.        Start:  06/12/24     Expected End:  07/24/24            The patient will decrease their time in the timed up and go to <15 seconds to demonstrate improved balance and functional ambulatory ability.        Start:  06/12/24    Expected End:  07/24/24            Increase LEFS to >=60       Start:  06/12/24    Expected End:  07/24/24            Increase L ankle AROM to >= 10 dorsiflexion, 50 plantarflexion, 25 inversion and 20 eversion with 5/5 MMT.       Start:  06/12/24    Expected End:  07/24/24

## 2024-06-27 ENCOUNTER — APPOINTMENT (OUTPATIENT)
Dept: PHYSICAL THERAPY | Facility: HOSPITAL | Age: 44
End: 2024-06-27
Payer: COMMERCIAL

## 2024-07-03 ENCOUNTER — APPOINTMENT (OUTPATIENT)
Dept: PHYSICAL THERAPY | Facility: HOSPITAL | Age: 44
End: 2024-07-03
Payer: COMMERCIAL

## 2024-07-03 DIAGNOSIS — R26.2 DIFFICULTY WALKING: ICD-10-CM

## 2024-07-03 PROCEDURE — 97110 THERAPEUTIC EXERCISES: CPT | Mod: GP

## 2024-07-03 PROCEDURE — 97112 NEUROMUSCULAR REEDUCATION: CPT | Mod: GP

## 2024-07-03 ASSESSMENT — PAIN SCALES - GENERAL: PAINLEVEL_OUTOF10: 0 - NO PAIN

## 2024-07-03 ASSESSMENT — PAIN - FUNCTIONAL ASSESSMENT: PAIN_FUNCTIONAL_ASSESSMENT: 0-10

## 2024-07-03 NOTE — PROGRESS NOTES
Physical Therapy    Physical Therapy Treatment    Patient Name: Margarita Ortega  MRN: [id number]    Today's Date: 7/3/2024  Time Calculation  Start Time: 0815  Stop Time: 0858  Time Calculation (min): 43 min     PT Therapeutic Procedures Time Entry  Neuromuscular Re-Education Time Entry: 13  Therapeutic Exercise Time Entry: 30      Assessment:  PT Assessment  Assessment Comment: Better with AM session. Progressing.  Plan:  OP PT Plan  PT Plan: Skilled PT (5/12)    Current Problem  1. Difficulty walking  Follow Up In Physical Therapy          General  PT  Visit  PT Received On: 07/03/24  General  General Comment: Feels pretty good today.    Subjective    Pain  Pain Assessment  Pain Assessment: 0-10  0-10 (Numeric) Pain Score: 0 - No pain    Objective     Activity Tolerance:  Activity Tolerance  Activity Tolerance Comments: Tolerated addition of balance work well.    Treatments:  Therapeutic Exercise  Therapeutic Exercise Performed: Yes  Therapeutic Exercise Activity 1: Pro2 x12  Therapeutic Exercise Activity 2: CC AROM 3x30 each in // bars  Therapeutic Exercise Activity 3: band iso eversion 3x10  Therapeutic Exercise Activity 4: isometric inversion 3x10  Therapeutic Exercise Activity 5: seated calf raise [id number]  Therapeutic Exercise Activity 6: standing calf raise 3x10  Therapeutic Exercise Activity 7: standing gastroc/soleus complex stretch 3x30 sec each    Balance/Neuromuscular Re-Education  Balance/Neuromuscular Re-Education Activity Performed: Yes  Balance/Neuromuscular Re-Education Activity 1: BTB unilateral pushout 1x10 each  Balance/Neuromuscular Re-Education Activity 2: Bosu step up to UE support 3x10    OP EDUCATION:       Goals:  Active       PT Problem       The patient will demonstrate full understanding and competent performance of their home exercise program to maintain or potentially further improve their presenting condition.        Start:  06/12/24    Expected End:  07/24/24            The patient will  ambulate 10 minutes continuously without pain increasing to greater than 1/10 to allow return to required community ambulation tasks.        Start:  06/12/24    Expected End:  07/24/24            The patient will decrease their time in the timed up and go to <15 seconds to demonstrate improved balance and functional ambulatory ability.        Start:  06/12/24    Expected End:  07/24/24            Increase LEFS to >=60       Start:  06/12/24    Expected End:  07/24/24            Increase L ankle AROM to >= 10 dorsiflexion, 50 plantarflexion, 25 inversion and 20 eversion with 5/5 MMT.       Start:  06/12/24    Expected End:  07/24/24

## 2024-07-05 ENCOUNTER — APPOINTMENT (OUTPATIENT)
Dept: PHYSICAL THERAPY | Facility: HOSPITAL | Age: 44
End: 2024-07-05
Payer: COMMERCIAL

## 2024-07-05 DIAGNOSIS — R26.2 DIFFICULTY WALKING: ICD-10-CM

## 2024-07-05 PROCEDURE — 97112 NEUROMUSCULAR REEDUCATION: CPT | Mod: GP

## 2024-07-05 PROCEDURE — 97110 THERAPEUTIC EXERCISES: CPT | Mod: GP

## 2024-07-05 ASSESSMENT — PAIN - FUNCTIONAL ASSESSMENT: PAIN_FUNCTIONAL_ASSESSMENT: 0-10

## 2024-07-05 ASSESSMENT — PAIN SCALES - GENERAL: PAINLEVEL_OUTOF10: 0 - NO PAIN

## 2024-07-05 NOTE — PROGRESS NOTES
Physical Therapy    Physical Therapy Treatment    Patient Name: Margarita Ortega  MRN: [id number]    Today's Date: 7/5/2024  Time Calculation  Start Time: 0815  Stop Time: 0855  Time Calculation (min): 40 min     PT Therapeutic Procedures Time Entry  Neuromuscular Re-Education Time Entry: 10  Therapeutic Exercise Time Entry: 30     Assessment:  PT Assessment  Assessment Comment: Progressing into increased balance challenge activities.    Plan:  OP PT Plan  PT Plan: Skilled PT (6/12)    Current Problem  1. Difficulty walking  Follow Up In Physical Therapy          General  PT  Visit  PT Received On: 07/05/24  General  General Comment: Feels good this morning.    Subjective    Pain  Pain Assessment  Pain Assessment: 0-10  0-10 (Numeric) Pain Score: 0 - No pain    Objective     Activity Tolerance:  Activity Tolerance  Activity Tolerance Comments: Adding resistance without issue today    Treatments:  Therapeutic Exercise  Therapeutic Exercise Performed: Yes  Therapeutic Exercise Activity 1: Pro2 x12  Therapeutic Exercise Activity 2: CC AROM 3x30 each in // bars  Therapeutic Exercise Activity 3: band iso eversion 3x10  Therapeutic Exercise Activity 4: isometric inversion 3x10  Therapeutic Exercise Activity 5: seated calf raise [id number]  Therapeutic Exercise Activity 6: standing calf raise 3x10  Therapeutic Exercise Activity 7: standing gastroc/soleus complex stretch 3x30 sec each    Balance/Neuromuscular Re-Education  Balance/Neuromuscular Re-Education Activity Performed: Yes  Balance/Neuromuscular Re-Education Activity 1: BTB uni pressout 3x10  Balance/Neuromuscular Re-Education Activity 2: Bosu step up (UE support) 3x10    OP EDUCATION:       Goals:  Active       PT Problem       The patient will demonstrate full understanding and competent performance of their home exercise program to maintain or potentially further improve their presenting condition.        Start:  06/12/24    Expected End:  07/24/24            The  patient will ambulate 10 minutes continuously without pain increasing to greater than 1/10 to allow return to required community ambulation tasks.        Start:  06/12/24    Expected End:  07/24/24            The patient will decrease their time in the timed up and go to <15 seconds to demonstrate improved balance and functional ambulatory ability.        Start:  06/12/24    Expected End:  07/24/24            Increase LEFS to >=60       Start:  06/12/24    Expected End:  07/24/24            Increase L ankle AROM to >= 10 dorsiflexion, 50 plantarflexion, 25 inversion and 20 eversion with 5/5 MMT.       Start:  06/12/24    Expected End:  07/24/24

## 2024-07-08 ENCOUNTER — TREATMENT (OUTPATIENT)
Dept: PHYSICAL THERAPY | Facility: HOSPITAL | Age: 44
End: 2024-07-08
Payer: COMMERCIAL

## 2024-07-08 DIAGNOSIS — R26.2 DIFFICULTY WALKING: ICD-10-CM

## 2024-07-08 PROCEDURE — 97112 NEUROMUSCULAR REEDUCATION: CPT | Mod: GP,CQ

## 2024-07-08 PROCEDURE — 97110 THERAPEUTIC EXERCISES: CPT | Mod: GP,CQ

## 2024-07-08 ASSESSMENT — PAIN DESCRIPTION - DESCRIPTORS: DESCRIPTORS: ACHING

## 2024-07-08 ASSESSMENT — PAIN - FUNCTIONAL ASSESSMENT: PAIN_FUNCTIONAL_ASSESSMENT: 0-10

## 2024-07-08 ASSESSMENT — PAIN SCALES - GENERAL: PAINLEVEL_OUTOF10: 2

## 2024-07-08 NOTE — PROGRESS NOTES
Physical Therapy Treatment    Patient Name: Margarita Ortega  MRN: [id number]  Today's Date: 7/8/2024  Time Calculation  Start Time: 1445  Stop Time: 1524  Time Calculation (min): 39 min        PT Therapeutic Procedures Time Entry  Neuromuscular Re-Education Time Entry: 10  Therapeutic Exercise Time Entry: 29                Insurance:  Visit number: 7 of 12  Authorization info: 12 authorized   Insurance Type: [employer]           Current Problem  1. Difficulty walking  Follow Up In Physical Therapy          General  Referred By: Dr. Beltran      Subjective   Current Condition:   Better  Patient reports having a little more achy/soreness today. Uses cane when out of house when going on uneven ground, or when pain levels are higher. Next ortho checkup is August 1st. Uses ice for pain relief and post therapy.     Performing HEP?: Partially    Precautions  Precautions  LE Weight Bearing Status: Weight Bearing as Tolerated  Pain  Pain Assessment: 0-10  0-10 (Numeric) Pain Score: 2  Pain Location: Ankle  Pain Orientation: Left  Pain Descriptors: Aching      Treatments:  Therapeutic Exercise  Therapeutic Exercise Performed: Yes    Pro cycle x12 min lvl 3, standing on hemalatha disc AROM 3x30 sec each in // bars, black band iso eversion 3x10, isometrics inversion 3x10, seated calf raises 3x10 22#, standing incline calf raises 3x10, standing gastroc/soleus stretch complex 3x30 each     Balance/Neuromuscular Re-Education  Balance/Neuromuscular Re-Education Activity Performed: Yes    BTB uni lateral stance press outs 3x10 each direction, bosu step ups with UE support as needed 3x10, single leg 3 way taps 2x10     Patient taking rest breaks throughout as needed d/t muscle fatigue (mostly during balance interventions)      EDUCATION:   Individual(s) Educated: Patient   Education Provided: Body Mechanics/Techniques   Handout(s) Provided: Scanned into chart  Home Program: In previous notes   Risk and Benefits Discussed with  Patient/Caregiver/Other: Yes   Patient/Caregiver Demonstrated Understanding: Yes   Patient Response to Education: Patient/Caregiver verbalized understanding of information and Patient/Caregiver performed return demonstration of exercises/activities    Assessment:   Patient tolerated all interventions well today. Continues to be challenged by balance activities. Rating pain 4-5/10 at end of session, but usually able to walk it out and pain resorts back to nothing.         Plan:  Continue with POC and as per patient tolerated to improve ability to complete functional tasks  Frequency: 2 x Week  Duration: 6 Weeks       Goals:  Active       PT Problem       The patient will demonstrate full understanding and competent performance of their home exercise program to maintain or potentially further improve their presenting condition.        Start:  06/12/24    Expected End:  07/24/24            The patient will ambulate 10 minutes continuously without pain increasing to greater than 1/10 to allow return to required community ambulation tasks.        Start:  06/12/24    Expected End:  07/24/24            The patient will decrease their time in the timed up and go to <15 seconds to demonstrate improved balance and functional ambulatory ability.        Start:  06/12/24    Expected End:  07/24/24            Increase LEFS to >=60       Start:  06/12/24    Expected End:  07/24/24            Increase L ankle AROM to >= 10 dorsiflexion, 50 plantarflexion, 25 inversion and 20 eversion with 5/5 MMT.       Start:  06/12/24    Expected End:  07/24/24                 Erika Kwan, PTA

## 2024-07-10 ENCOUNTER — TREATMENT (OUTPATIENT)
Dept: PHYSICAL THERAPY | Facility: HOSPITAL | Age: 44
End: 2024-07-10
Payer: COMMERCIAL

## 2024-07-10 DIAGNOSIS — R26.2 DIFFICULTY WALKING: ICD-10-CM

## 2024-07-10 PROCEDURE — 97110 THERAPEUTIC EXERCISES: CPT | Mod: GP,CQ

## 2024-07-10 ASSESSMENT — PAIN SCALES - GENERAL: PAINLEVEL_OUTOF10: 4

## 2024-07-10 ASSESSMENT — PAIN - FUNCTIONAL ASSESSMENT: PAIN_FUNCTIONAL_ASSESSMENT: 0-10

## 2024-07-10 NOTE — PROGRESS NOTES
Physical Therapy Treatment    Patient Name: Margarita Ortega  MRN: [id number]  Today's Date: 7/10/2024  Time Calculation  Start Time: 0800  Stop Time: 0845  Time Calculation (min): 45 min        PT Therapeutic Procedures Time Entry  Therapeutic Exercise Time Entry: 45                Insurance:  Visit number: 8 of 12  Authorization info: 12 authorized   Insurance Type: [employer]           Current Problem  1. Difficulty walking  Follow Up In Physical Therapy          General  Referred By: Dr. Beltran      Subjective   Current Condition:   Better   Patient reports she was sore after session Monday and was very busy at work/home yesterday. This resulted in a lot of time on her feet which has caused some more pain/soreness this morning. States she does not usually need to rely on her cane so much in the morning, but today she does. Also has some increased swelling today, hard time getting her shoe on.   Sees ortho on August 1st for next checkup.     Performing HEP?: Yes    Precautions  Precautions  LE Weight Bearing Status: Weight Bearing as Tolerated  Pain  Pain Assessment: 0-10  0-10 (Numeric) Pain Score: 4  Pain Location: Ankle  Pain Orientation: Left        Treatments:  Therapeutic Exercise  Therapeutic Exercise Performed: Yes     Pro cycle x12 min lvl 3, standing on hemalatha disc AROM 3x30 sec each in // bars, black band iso eversion 3x10, isometrics inversion 3x10, seated calf raises 3x10 22#, standing calf raises 3x10, standing gastroc/soleus stretch complex 3x30 each, seated AROM all directions x30 each    Application of Tubigrip F with medium sera hose to aid in reducing swelling.     Balance/Neuromuscular Re-Education  Balance/Neuromuscular Re-Education Activity Performed: No did not perform d/t increased pain      BTB uni lateral stance press outs 3x10 each direction, bosu step ups with UE support as needed 3x10, single leg 3 way taps 2x10          EDUCATION:   Individual(s) Educated: Patient   Education Provided:  Body Mechanics/Compression   Handout(s) Provided: Scanned into chart  Home Program: In previous notes  Risk and Benefits Discussed with Patient/Caregiver/Other: Yes   Patient/Caregiver Demonstrated Understanding: Yes   Patient Response to Education: Patient/Caregiver verbalized understanding of information and Patient/Caregiver performed return demonstration of exercises/activities    Assessment:   Patient had more difficulty completing all interventions today with pain increasing to 5/10 (facial expressions also telling). Holding off on balance activities today and focusing on ROM.        Plan:  Continue with POC and as per patient tolerated to improve ability to complete functional tasks  Frequency: 2 x Week  Duration: 6 Weeks       Goals:  Active       PT Problem       The patient will demonstrate full understanding and competent performance of their home exercise program to maintain or potentially further improve their presenting condition.        Start:  06/12/24    Expected End:  07/24/24            The patient will ambulate 10 minutes continuously without pain increasing to greater than 1/10 to allow return to required community ambulation tasks.        Start:  06/12/24    Expected End:  07/24/24            The patient will decrease their time in the timed up and go to <15 seconds to demonstrate improved balance and functional ambulatory ability.        Start:  06/12/24    Expected End:  07/24/24            Increase LEFS to >=60       Start:  06/12/24    Expected End:  07/24/24            Increase L ankle AROM to >= 10 dorsiflexion, 50 plantarflexion, 25 inversion and 20 eversion with 5/5 MMT.       Start:  06/12/24    Expected End:  07/24/24                 Erika Kwan, PTA

## 2024-07-15 ENCOUNTER — TREATMENT (OUTPATIENT)
Dept: PHYSICAL THERAPY | Facility: HOSPITAL | Age: 44
End: 2024-07-15
Payer: COMMERCIAL

## 2024-07-15 DIAGNOSIS — R26.2 DIFFICULTY WALKING: ICD-10-CM

## 2024-07-15 PROCEDURE — 97110 THERAPEUTIC EXERCISES: CPT | Mod: GP,CQ

## 2024-07-15 PROCEDURE — 97112 NEUROMUSCULAR REEDUCATION: CPT | Mod: GP,CQ

## 2024-07-15 ASSESSMENT — PAIN SCALES - GENERAL: PAINLEVEL_OUTOF10: 1

## 2024-07-15 ASSESSMENT — PAIN - FUNCTIONAL ASSESSMENT: PAIN_FUNCTIONAL_ASSESSMENT: 0-10

## 2024-07-15 NOTE — PROGRESS NOTES
Physical Therapy Treatment    Patient Name: Margarita Ortega  MRN: [id number]  Today's Date: 7/15/2024  Time Calculation  Start Time: 0800  Stop Time: 0840  Time Calculation (min): 40 min        PT Therapeutic Procedures Time Entry  Neuromuscular Re-Education Time Entry: 10  Therapeutic Exercise Time Entry: 30                Insurance:  Visit number: 9 of 12  Authorization info: 12 authorized   Insurance Type: [employer]           Current Problem  1. Difficulty walking  Follow Up In Physical Therapy          General  Referred By: Dr. Beltran      Subjective   Current Condition:   Better  Patient reports she took it easier this weekend and having much less pain today. States the sera hose and tubi  really helped her after last session.    Performing HEP?: Yes    Precautions  Precautions  LE Weight Bearing Status: Weight Bearing as Tolerated  Pain  Pain Assessment: 0-10  0-10 (Numeric) Pain Score: 1  Pain Location: Ankle  Pain Orientation: Left        Treatments:    Therapeutic Exercise  Therapeutic Exercise Performed: Yes     Pro cycle x12 min lvl 3, standing on hemalatha disc AROM 3x30 sec each in // bars, black band iso eversion 3x10, isometrics inversion 3x10, seated calf raises/toe raises 3x10 22#, standing inclined calf raises 3x10, standing gastroc/soleus stretch complex 3x30 each    Balance/Neuromuscular Re-Education  Balance/Neuromuscular Re-Education Activity Performed: Yes     BTB uni lateral stance press outs 3x10 each direction, bosu step ups with UE support as needed 3x10 (static standing rest breaks as needed throughout)       EDUCATION:   Individual(s) Educated: Patient   Education Provided: Body Mechanics   Handout(s) Provided: Scanned into chart  Home Program: In previous notes  Risk and Benefits Discussed with Patient/Caregiver/Other: Yes   Patient/Caregiver Demonstrated Understanding: Yes   Patient Response to Education: Patient/Caregiver verbalized understanding of information and Patient/Caregiver  performed return demonstration of exercises/activities    Assessment:   Patient tolerated session better today, but continues to have c/o soreness with balance interventions. Rating pain 3-4/10 post session.         Plan:  Continue with POC and as per patient tolerated to improve ability to complete functional tasks  Frequency: 2 x Week  Duration: 6 Weeks       Goals:  Active       PT Problem       The patient will demonstrate full understanding and competent performance of their home exercise program to maintain or potentially further improve their presenting condition.        Start:  06/12/24    Expected End:  07/24/24            The patient will ambulate 10 minutes continuously without pain increasing to greater than 1/10 to allow return to required community ambulation tasks.        Start:  06/12/24    Expected End:  07/24/24            The patient will decrease their time in the timed up and go to <15 seconds to demonstrate improved balance and functional ambulatory ability.        Start:  06/12/24    Expected End:  07/24/24            Increase LEFS to >=60       Start:  06/12/24    Expected End:  07/24/24            Increase L ankle AROM to >= 10 dorsiflexion, 50 plantarflexion, 25 inversion and 20 eversion with 5/5 MMT.       Start:  06/12/24    Expected End:  07/24/24                 Erika Kwan, PTA

## 2024-07-17 ENCOUNTER — APPOINTMENT (OUTPATIENT)
Dept: PHYSICAL THERAPY | Facility: HOSPITAL | Age: 44
End: 2024-07-17
Payer: COMMERCIAL

## 2024-07-19 ENCOUNTER — TREATMENT (OUTPATIENT)
Dept: PHYSICAL THERAPY | Facility: HOSPITAL | Age: 44
End: 2024-07-19
Payer: COMMERCIAL

## 2024-07-19 DIAGNOSIS — R26.2 DIFFICULTY WALKING: ICD-10-CM

## 2024-07-19 PROCEDURE — 97110 THERAPEUTIC EXERCISES: CPT | Mod: GP,CQ

## 2024-07-19 PROCEDURE — 97112 NEUROMUSCULAR REEDUCATION: CPT | Mod: GP,CQ

## 2024-07-19 ASSESSMENT — PAIN - FUNCTIONAL ASSESSMENT: PAIN_FUNCTIONAL_ASSESSMENT: 0-10

## 2024-07-19 ASSESSMENT — PAIN SCALES - GENERAL: PAINLEVEL_OUTOF10: 1

## 2024-07-19 NOTE — PROGRESS NOTES
Physical Therapy Treatment    Patient Name: Margarita Ortega  MRN: [id number]  Today's Date: 7/19/2024  Time Calculation  Start Time: 0841  Stop Time: 0924  Time Calculation (min): 43 min        PT Therapeutic Procedures Time Entry  Neuromuscular Re-Education Time Entry: 10  Therapeutic Exercise Time Entry: 33                Insurance:  Visit number: 10 of 12  Authorization info: 12 authorized   Insurance Type: [employer]           Current Problem  1. Difficulty walking  Follow Up In Physical Therapy          General  Referred By: Dr. Beltran      Subjective   Current Condition:   Same  Patient reports Tuesday when she was at work she had been on her feet all day. At one point when she was sitting in a chair and bent down to grab something from the floor. She felt like something tweaked in her ankle (like someone plucking a guitar string). Afterwards had more pain/swelling that lasted around 24 hours, but did get progressively better throughout.     Performing HEP?: Yes    Precautions  Precautions  LE Weight Bearing Status: Weight Bearing as Tolerated  Pain  Pain Assessment: 0-10  0-10 (Numeric) Pain Score: 1  Pain Type: Acute pain  Pain Location: Ankle  Pain Orientation: Left        Treatments:    Therapeutic Exercise  Therapeutic Exercise Performed: Yes     Pro cycle x15 min lvl 3, standing on hemalatha disc AROM 3x30 sec each in // bars, black band iso eversion 3x10, isometrics inversion 3x10, seated calf raises/toe raises 3x10 22#, standing inclined calf raises 3x10, standing gastroc/soleus stretch complex 3x30 each     Balance/Neuromuscular Re-Education  Balance/Neuromuscular Re-Education Activity Performed: Yes     BTB uni lateral stance press outs 3x10 each direction, bosu step ups with UE support as needed 3x10 (static standing rest breaks as needed throughout)     EDUCATION:   Individual(s) Educated: Patient   Education Provided: Body Mechanics   Handout(s) Provided: Scanned into chart  Home Program: In previous  notes  Risk and Benefits Discussed with Patient/Caregiver/Other: Yes   Patient/Caregiver Demonstrated Understanding: Yes   Patient Response to Education: Patient/Caregiver verbalized understanding of information and Patient/Caregiver performed return demonstration of exercises/activities    Assessment:   Patient was able to tolerate all interventions at the same level as prior sessions. Reporting 2-3/10 pain and soreness by end of treatment.         Plan:  Continue with POC and as per patient tolerated to improve ability to complete functional tasks  Frequency: 2 x Week  Duration: 6 Weeks       Goals:  Active       PT Problem       The patient will demonstrate full understanding and competent performance of their home exercise program to maintain or potentially further improve their presenting condition.        Start:  06/12/24    Expected End:  07/24/24            The patient will ambulate 10 minutes continuously without pain increasing to greater than 1/10 to allow return to required community ambulation tasks.        Start:  06/12/24    Expected End:  07/24/24            The patient will decrease their time in the timed up and go to <15 seconds to demonstrate improved balance and functional ambulatory ability.        Start:  06/12/24    Expected End:  07/24/24            Increase LEFS to >=60       Start:  06/12/24    Expected End:  07/24/24            Increase L ankle AROM to >= 10 dorsiflexion, 50 plantarflexion, 25 inversion and 20 eversion with 5/5 MMT.       Start:  06/12/24    Expected End:  07/24/24                 Erika Kwan, PTA

## 2024-07-22 ENCOUNTER — TREATMENT (OUTPATIENT)
Dept: PHYSICAL THERAPY | Facility: HOSPITAL | Age: 44
End: 2024-07-22
Payer: COMMERCIAL

## 2024-07-22 DIAGNOSIS — R26.2 DIFFICULTY WALKING: ICD-10-CM

## 2024-07-22 DIAGNOSIS — F51.01 PRIMARY INSOMNIA: ICD-10-CM

## 2024-07-22 PROCEDURE — 97110 THERAPEUTIC EXERCISES: CPT | Mod: GP

## 2024-07-22 PROCEDURE — 97112 NEUROMUSCULAR REEDUCATION: CPT | Mod: GP

## 2024-07-22 RX ORDER — TRAZODONE HYDROCHLORIDE 100 MG/1
100 TABLET ORAL NIGHTLY PRN
Qty: 30 TABLET | Refills: 1 | Status: SHIPPED | OUTPATIENT
Start: 2024-07-22

## 2024-07-22 ASSESSMENT — PAIN - FUNCTIONAL ASSESSMENT: PAIN_FUNCTIONAL_ASSESSMENT: 0-10

## 2024-07-22 ASSESSMENT — PAIN SCALES - GENERAL: PAINLEVEL_OUTOF10: 1

## 2024-07-24 ENCOUNTER — APPOINTMENT (OUTPATIENT)
Dept: PHYSICAL THERAPY | Facility: HOSPITAL | Age: 44
End: 2024-07-24
Payer: COMMERCIAL

## 2024-07-25 ENCOUNTER — DOCUMENTATION (OUTPATIENT)
Dept: PHYSICAL THERAPY | Facility: HOSPITAL | Age: 44
End: 2024-07-25

## 2024-07-25 ENCOUNTER — TREATMENT (OUTPATIENT)
Dept: PHYSICAL THERAPY | Facility: HOSPITAL | Age: 44
End: 2024-07-25
Payer: COMMERCIAL

## 2024-07-25 DIAGNOSIS — R26.2 DIFFICULTY WALKING: ICD-10-CM

## 2024-07-25 PROCEDURE — 97112 NEUROMUSCULAR REEDUCATION: CPT | Mod: GP

## 2024-07-25 PROCEDURE — 97110 THERAPEUTIC EXERCISES: CPT | Mod: GP

## 2024-07-25 ASSESSMENT — PAIN - FUNCTIONAL ASSESSMENT: PAIN_FUNCTIONAL_ASSESSMENT: 0-10

## 2024-07-25 ASSESSMENT — PAIN SCALES - GENERAL: PAINLEVEL_OUTOF10: 0 - NO PAIN

## 2024-07-25 NOTE — PROGRESS NOTES
Physical Therapy    Discharge Summary    Name: Margarita Ortega  MRN: [id number]  : 1980  Date: 24    Discharge Summary: PT    Discharge Information: Date of discharge , Date of last visit , and Referred for difficulty walking s/p L ankle surgery.    Rehab Discharge Reason: Achieved all and/or the most significant goals(s)

## 2024-07-25 NOTE — PROGRESS NOTES
Physical Therapy    Physical Therapy Treatment    Patient Name: Margarita Ortega  MRN: [id number]  Today's Date: 7/25/2024    Time Entry:   Time Calculation  Start Time: 1515  Stop Time: 1600  Time Calculation (min): 45 min     PT Therapeutic Procedures Time Entry  Neuromuscular Re-Education Time Entry: 15  Therapeutic Exercise Time Entry: 30     Assessment:  PT Assessment  Assessment Comment: Patient meeting all functional mobility goals.    Plan:  OP PT Plan  PT Plan: No Additional PT interventions required at this time    Current Problem  1. Difficulty walking  Follow Up In Physical Therapy          General  PT  Visit  PT Received On: 07/25/24  General  General Comment: Doing very well as she approaches ortho recheck.    Subjective    Pain  Pain Assessment  Pain Assessment: 0-10  0-10 (Numeric) Pain Score: 0 - No pain    Objective   Extremity/Trunk Assessment  L ankle: dorsiflexion 14, PF 52, Inversion 28, eversion 22    Activity Tolerance:  Activity Tolerance  Activity Tolerance Comments: No issues today with any activity.    Outcome Measures:  LEFS 61  TUG 9.3 sec.     Treatments:  Therapeutic Exercise  Therapeutic Exercise Performed: Yes  Therapeutic Exercise Activity 1: Pro2 x15  Therapeutic Exercise Activity 2: Standing CC AROM 3x1 min  Therapeutic Exercise Activity 3: TB eversion isometric 3x10  Therapeutic Exercise Activity 4: inversion isometrics 3x10  Therapeutic Exercise Activity 5: seated calf raise [id number]  Therapeutic Exercise Activity 6: Standing calf raise  Therapeutic Exercise Activity 7: gastroc/soleus complex 3 cycles    Balance/Neuromuscular Re-Education  Balance/Neuromuscular Re-Education Activity Performed: Yes    OP EDUCATION:       Goals:  Resolved       PT Problem       The patient will demonstrate full understanding and competent performance of their home exercise program to maintain or potentially further improve their presenting condition.  (Met)       Start:  06/12/24    Expected End:   07/24/24    Resolved:  07/25/24         The patient will ambulate 10 minutes continuously without pain increasing to greater than 1/10 to allow return to required community ambulation tasks.  (Met)       Start:  06/12/24    Expected End:  07/24/24    Resolved:  07/25/24         The patient will decrease their time in the timed up and go to <15 seconds to demonstrate improved balance and functional ambulatory ability.  (Met)       Start:  06/12/24    Expected End:  07/24/24    Resolved:  07/25/24         Increase LEFS to >=60 (Met)       Start:  06/12/24    Expected End:  07/24/24    Resolved:  07/25/24         Increase L ankle AROM to >= 10 dorsiflexion, 50 plantarflexion, 25 inversion and 20 eversion with 5/5 MMT. (Met)       Start:  06/12/24    Expected End:  07/24/24    Resolved:  07/25/24

## 2024-08-12 NOTE — PROGRESS NOTES
"Subjective   Patient ID: Margarita Ortega is a 43 y.o. female who presents for Follow-up (4 months).    Ankle pain: Doing ok, still getting pain and swelling. She was told she might take up to a year to fully healed. She is done with PT and she is followup as needed with Dr. Beltran.      Insomnia: She is taking prozac, no SE's. Feels it is helping but she is having issues with sleeping, hydroxyzine is helping. Sometimes she takes 100mg and sometimes she takes 50mg. If she stays up late she will take the 50mg so she is not fatigued in the morning. No shortness of breath. No palpitations or heart racing.      Nasal congestion: Started about a month ago. No rhinorrhea, no fever, sometimes HA and sinus pressure. Two sick contact with COVID although she had multiple tests negative. No PND or rhinorrhea. No cough or sore throat. Snoring is worse than normal. Nasal strips are not even helping.      Back pain: Still with pain in the left side of her lower back off and on, gets it with standing, housework. Not radiating into the legs anymore. Completed xray. She is taking a supplement for arthritis. She is waking up with back pain, pain is worse in the morning.     Prediabetes: Not on meds. Weight has been stable.      She has 4 boys. Moved here 2y ago.      All other systems have been reviewed and are negative for complaint    Objective   /73 (BP Location: Left arm, Patient Position: Sitting, BP Cuff Size: Large adult)   Pulse 76   Ht 1.651 m (5' 5\")   Wt 102 kg (224 lb)   LMP 05/11/2023   BMI 37.28 kg/m²     Gen: No acute distress, alert and oriented x3, pleasant   HEENT: moist mucous membranes, b/l external auditory canals are clear of debris, TMs within normal limits, no oropharyngeal lesions, eomi, perrla   Neck: thyroid within normal limits, no lymphadenopathy   CV: RRR, normal S1/S2, no murmur   Resp: Clear to auscultation bilaterally, no wheezes or rhonchi appreciated  Abd: soft, nontender, non-distended, " no guarding/rigidity, bowel sounds present  Extr: no edema, no calf tenderness  Derm: Skin is warm and dry, no rashes appreciated  Psych: mood is good, affect is congruent, good hygiene, normal speech and eye contact  Neuro: cranial nerves grossly intact, normal gait    Assessment/Plan     #Acute sinusitis:  Rx sent keflex    #Ankle pain  Doing well since surgery     #Hot flashes  Recommending to touch base with gyn about HRT  Gyn recommended that sx likely related to stress level  They together opted to hold off on HRT     #Insomnia  #LUDMILA (supine only)  Doing well on trazodone     #Grief  Doing reasonably well on fluoxetine     #Back pain with radiculopathy  Xrays show DJD  Did ok with medrol pack, muscle relaxer  Declining PT at this point     #Environmental allergies  on cetirizine  she gets nasacort otc  she takes flonase during winter but it doesn't help as much  she also uses eye drops (pataday)     #Prediabetes:  doing well since weight loss  check labs      HCM:  Mammogram Dec was negative  Declining flu vaccine

## 2024-08-23 ENCOUNTER — APPOINTMENT (OUTPATIENT)
Dept: PRIMARY CARE | Facility: CLINIC | Age: 44
End: 2024-08-23
Payer: COMMERCIAL

## 2024-08-23 VITALS
HEART RATE: 76 BPM | SYSTOLIC BLOOD PRESSURE: 110 MMHG | DIASTOLIC BLOOD PRESSURE: 73 MMHG | WEIGHT: 224 LBS | BODY MASS INDEX: 37.32 KG/M2 | HEIGHT: 65 IN

## 2024-08-23 DIAGNOSIS — Z13.220 SCREENING FOR HYPERLIPIDEMIA: ICD-10-CM

## 2024-08-23 DIAGNOSIS — R73.09 ELEVATED GLUCOSE: Primary | ICD-10-CM

## 2024-08-23 DIAGNOSIS — Z12.31 ENCOUNTER FOR SCREENING MAMMOGRAM FOR MALIGNANT NEOPLASM OF BREAST: ICD-10-CM

## 2024-08-23 DIAGNOSIS — J01.90 ACUTE SINUSITIS, RECURRENCE NOT SPECIFIED, UNSPECIFIED LOCATION: ICD-10-CM

## 2024-08-23 DIAGNOSIS — K21.9 GASTROESOPHAGEAL REFLUX DISEASE, UNSPECIFIED WHETHER ESOPHAGITIS PRESENT: ICD-10-CM

## 2024-08-23 DIAGNOSIS — Z00.00 HEALTHCARE MAINTENANCE: ICD-10-CM

## 2024-08-23 PROCEDURE — 3008F BODY MASS INDEX DOCD: CPT | Performed by: FAMILY MEDICINE

## 2024-08-23 PROCEDURE — 99214 OFFICE O/P EST MOD 30 MIN: CPT | Performed by: FAMILY MEDICINE

## 2024-08-23 PROCEDURE — 1036F TOBACCO NON-USER: CPT | Performed by: FAMILY MEDICINE

## 2024-08-23 RX ORDER — CEPHALEXIN 500 MG/1
500 CAPSULE ORAL 2 TIMES DAILY
Qty: 20 CAPSULE | Refills: 0 | Status: SHIPPED | OUTPATIENT
Start: 2024-08-23 | End: 2024-09-02

## 2024-08-23 RX ORDER — FAMOTIDINE 40 MG/1
40 TABLET, FILM COATED ORAL DAILY
Qty: 30 TABLET | Refills: 0 | Status: SHIPPED | OUTPATIENT
Start: 2024-08-23 | End: 2024-09-22

## 2024-09-13 DIAGNOSIS — H57.9 ITCHY, WATERY, AND RED EYE: ICD-10-CM

## 2024-09-13 RX ORDER — KETOTIFEN FUMARATE 0.35 MG/ML
SOLUTION/ DROPS OPHTHALMIC
Qty: 5 ML | Refills: 3 | Status: SHIPPED | OUTPATIENT
Start: 2024-09-13

## 2024-09-18 DIAGNOSIS — Z63.4 GRIEF AT LOSS OF CHILD: ICD-10-CM

## 2024-09-18 DIAGNOSIS — F43.21 GRIEF AT LOSS OF CHILD: ICD-10-CM

## 2024-09-18 DIAGNOSIS — F51.01 PRIMARY INSOMNIA: ICD-10-CM

## 2024-09-18 SDOH — SOCIAL STABILITY - SOCIAL INSECURITY: DISSAPEARANCE AND DEATH OF FAMILY MEMBER: Z63.4

## 2024-09-20 RX ORDER — TRAZODONE HYDROCHLORIDE 100 MG/1
100 TABLET ORAL NIGHTLY PRN
Qty: 30 TABLET | Refills: 1 | Status: SHIPPED | OUTPATIENT
Start: 2024-09-20

## 2024-09-20 RX ORDER — FLUOXETINE HYDROCHLORIDE 20 MG/1
20 CAPSULE ORAL DAILY
Qty: 30 CAPSULE | Refills: 5 | Status: SHIPPED | OUTPATIENT
Start: 2024-09-20

## 2024-10-21 ENCOUNTER — HOSPITAL ENCOUNTER (EMERGENCY)
Facility: HOSPITAL | Age: 44
Discharge: HOME | End: 2024-10-21
Attending: EMERGENCY MEDICINE
Payer: COMMERCIAL

## 2024-10-21 VITALS
HEIGHT: 65 IN | TEMPERATURE: 98.6 F | WEIGHT: 236.11 LBS | DIASTOLIC BLOOD PRESSURE: 70 MMHG | HEART RATE: 88 BPM | SYSTOLIC BLOOD PRESSURE: 98 MMHG | BODY MASS INDEX: 39.34 KG/M2 | RESPIRATION RATE: 20 BRPM | OXYGEN SATURATION: 97 %

## 2024-10-21 DIAGNOSIS — J98.01 ACUTE BRONCHOSPASM: ICD-10-CM

## 2024-10-21 DIAGNOSIS — J70.5: Primary | ICD-10-CM

## 2024-10-21 PROCEDURE — 94640 AIRWAY INHALATION TREATMENT: CPT

## 2024-10-21 PROCEDURE — 2500000001 HC RX 250 WO HCPCS SELF ADMINISTERED DRUGS (ALT 637 FOR MEDICARE OP): Mod: SE | Performed by: EMERGENCY MEDICINE

## 2024-10-21 PROCEDURE — 2500000004 HC RX 250 GENERAL PHARMACY W/ HCPCS (ALT 636 FOR OP/ED): Mod: SE

## 2024-10-21 PROCEDURE — 99283 EMERGENCY DEPT VISIT LOW MDM: CPT | Mod: 25

## 2024-10-21 RX ORDER — ALBUTEROL SULFATE 90 UG/1
INHALANT RESPIRATORY (INHALATION)
Status: DISCONTINUED
Start: 2024-10-21 | End: 2024-10-21 | Stop reason: HOSPADM

## 2024-10-21 RX ORDER — PREDNISONE 20 MG/1
TABLET ORAL
Status: COMPLETED
Start: 2024-10-21 | End: 2024-10-21

## 2024-10-21 RX ORDER — ALBUTEROL SULFATE 90 UG/1
2 INHALANT RESPIRATORY (INHALATION) ONCE
Status: COMPLETED | OUTPATIENT
Start: 2024-10-21 | End: 2024-10-21

## 2024-10-21 RX ORDER — PREDNISONE 20 MG/1
60 TABLET ORAL ONCE
Status: COMPLETED | OUTPATIENT
Start: 2024-10-21 | End: 2024-10-21

## 2024-10-21 RX ORDER — PREDNISONE 20 MG/1
40 TABLET ORAL DAILY
Qty: 8 TABLET | Refills: 0 | Status: SHIPPED | OUTPATIENT
Start: 2024-10-21 | End: 2024-10-25

## 2024-10-21 ASSESSMENT — PAIN - FUNCTIONAL ASSESSMENT: PAIN_FUNCTIONAL_ASSESSMENT: 0-10
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
## 2024-10-21 NOTE — DISCHARGE INSTRUCTIONS
2 puffs of albuterol inhaler with spacer every 3-4 hours as needed.  Take prednisone once a day for the next few days.  Push clear fluids and rest.  Activity as tolerated.  Follow-up with your primary care in 3 to 5 days as needed

## 2024-11-12 DIAGNOSIS — F51.01 PRIMARY INSOMNIA: ICD-10-CM

## 2024-11-12 RX ORDER — TRAZODONE HYDROCHLORIDE 100 MG/1
100 TABLET ORAL NIGHTLY PRN
Qty: 30 TABLET | Refills: 1 | Status: SHIPPED | OUTPATIENT
Start: 2024-11-12

## 2025-01-08 DIAGNOSIS — F51.01 PRIMARY INSOMNIA: ICD-10-CM

## 2025-01-08 RX ORDER — TRAZODONE HYDROCHLORIDE 100 MG/1
100 TABLET ORAL NIGHTLY PRN
Qty: 30 TABLET | Refills: 1 | Status: SHIPPED | OUTPATIENT
Start: 2025-01-08

## 2025-02-25 LAB
ALBUMIN SERPL-MCNC: 4.5 G/DL (ref 3.6–5.1)
ALP SERPL-CCNC: 55 U/L (ref 31–125)
ALT SERPL-CCNC: 35 U/L (ref 6–29)
ANION GAP SERPL CALCULATED.4IONS-SCNC: 8 MMOL/L (CALC) (ref 7–17)
AST SERPL-CCNC: 19 U/L (ref 10–30)
BASOPHILS # BLD AUTO: 28 CELLS/UL (ref 0–200)
BASOPHILS NFR BLD AUTO: 0.6 %
BILIRUB SERPL-MCNC: 0.5 MG/DL (ref 0.2–1.2)
BUN SERPL-MCNC: 14 MG/DL (ref 7–25)
CALCIUM SERPL-MCNC: 9.2 MG/DL (ref 8.6–10.2)
CHLORIDE SERPL-SCNC: 105 MMOL/L (ref 98–110)
CHOLEST SERPL-MCNC: 154 MG/DL
CHOLEST/HDLC SERPL: 4.4 (CALC)
CO2 SERPL-SCNC: 27 MMOL/L (ref 20–32)
CREAT SERPL-MCNC: 0.53 MG/DL (ref 0.5–0.99)
EGFRCR SERPLBLD CKD-EPI 2021: 118 ML/MIN/1.73M2
EOSINOPHIL # BLD AUTO: 9 CELLS/UL (ref 15–500)
EOSINOPHIL NFR BLD AUTO: 0.2 %
ERYTHROCYTE [DISTWIDTH] IN BLOOD BY AUTOMATED COUNT: 13.4 % (ref 11–15)
EST. AVERAGE GLUCOSE BLD GHB EST-MCNC: 108 MG/DL
EST. AVERAGE GLUCOSE BLD GHB EST-SCNC: 6 MMOL/L
GLUCOSE SERPL-MCNC: 112 MG/DL (ref 65–139)
HBA1C MFR BLD: 5.4 % OF TOTAL HGB
HCT VFR BLD AUTO: 40.7 % (ref 35–45)
HDLC SERPL-MCNC: 35 MG/DL
HGB BLD-MCNC: 13.6 G/DL (ref 11.7–15.5)
LDLC SERPL CALC-MCNC: 94 MG/DL (CALC)
LYMPHOCYTES # BLD AUTO: 1001 CELLS/UL (ref 850–3900)
LYMPHOCYTES NFR BLD AUTO: 21.3 %
MCH RBC QN AUTO: 28.8 PG (ref 27–33)
MCHC RBC AUTO-ENTMCNC: 33.4 G/DL (ref 32–36)
MCV RBC AUTO: 86 FL (ref 80–100)
MONOCYTES # BLD AUTO: 320 CELLS/UL (ref 200–950)
MONOCYTES NFR BLD AUTO: 6.8 %
NEUTROPHILS # BLD AUTO: 3342 CELLS/UL (ref 1500–7800)
NEUTROPHILS NFR BLD AUTO: 71.1 %
NONHDLC SERPL-MCNC: 119 MG/DL (CALC)
PLATELET # BLD AUTO: 179 THOUSAND/UL (ref 140–400)
PMV BLD REES-ECKER: 10.9 FL (ref 7.5–12.5)
POTASSIUM SERPL-SCNC: 4 MMOL/L (ref 3.5–5.3)
PROT SERPL-MCNC: 6.6 G/DL (ref 6.1–8.1)
RBC # BLD AUTO: 4.73 MILLION/UL (ref 3.8–5.1)
SODIUM SERPL-SCNC: 140 MMOL/L (ref 135–146)
TRIGL SERPL-MCNC: 148 MG/DL
WBC # BLD AUTO: 4.7 THOUSAND/UL (ref 3.8–10.8)

## 2025-02-25 NOTE — PROGRESS NOTES
"Subjective   Patient ID: Margarita Ortega is a 44 y.o. female who presents for Follow-up (6 months; discuss cholesterol).     Insomnia: She is taking prozac, no SE's. Feels it is helping. Having blue days once a week but overall mood has been very manageable.      Back pain: Still with pain in the left side of her lower back off and on, gets it with standing, housework. Not radiating into the legs anymore. Completed xray. She is taking a supplement for arthritis. She is waking up with back pain, pain is worse in the morning.     Prediabetes: Not on meds. Weight has been stable.      She has 4 boys. Moved here 2y ago.      All other systems have been reviewed and are negative for complaint    Objective   /73 (BP Location: Left arm, Patient Position: Sitting, BP Cuff Size: Large adult)   Pulse 70   Ht 1.651 m (5' 5\")   Wt 93.4 kg (206 lb)   LMP 05/11/2023   BMI 34.28 kg/m²     Gen: No acute distress, alert and oriented x3, pleasant   HEENT: moist mucous membranes, b/l external auditory canals are clear of debris, TMs within normal limits, no oropharyngeal lesions, eomi, perrla   Neck: thyroid within normal limits, no lymphadenopathy   CV: RRR, normal S1/S2, no murmur   Resp: Clear to auscultation bilaterally, no wheezes or rhonchi appreciated  Abd: soft, nontender, non-distended, no guarding/rigidity, bowel sounds present  Extr: no edema, no calf tenderness  Derm: Skin is warm and dry, no rashes appreciated  Psych: mood is good, affect is congruent, good hygiene, normal speech and eye contact  Neuro: cranial nerves grossly intact, normal gait    Assessment/Plan      #Hot flashes  Recommending to touch base with gyn about HRT  Gyn recommended that sx likely related to stress level  They together opted to hold off on HRT     #Insomnia  #LUDMILA (supine only)  Doing well on trazodone     #Grief  Doing reasonably well on fluoxetine     #Back pain with radiculopathy  Xrays show DJD  Did ok with medrol pack, muscle " relaxer  Declining PT at this point  Doing ok with prn ibuprofen     #Environmental allergies  on cetirizine  she gets nasacort otc  she takes flonase during winter but it doesn't help as much  she also uses eye drops (pataday)     #Prediabetes:  doing well since weight loss  check labs      HCM:  Mammogram overdue  Declining flu vaccine  No further pap due to hysterectomy but she does plan to continue with yearly pelvic

## 2025-02-28 ENCOUNTER — APPOINTMENT (OUTPATIENT)
Dept: PRIMARY CARE | Facility: CLINIC | Age: 45
End: 2025-02-28
Payer: COMMERCIAL

## 2025-02-28 VITALS
DIASTOLIC BLOOD PRESSURE: 73 MMHG | HEART RATE: 70 BPM | SYSTOLIC BLOOD PRESSURE: 104 MMHG | WEIGHT: 206 LBS | HEIGHT: 65 IN | BODY MASS INDEX: 34.32 KG/M2

## 2025-02-28 DIAGNOSIS — F51.01 PRIMARY INSOMNIA: Primary | ICD-10-CM

## 2025-02-28 PROCEDURE — 99214 OFFICE O/P EST MOD 30 MIN: CPT | Performed by: FAMILY MEDICINE

## 2025-02-28 PROCEDURE — 1036F TOBACCO NON-USER: CPT | Performed by: FAMILY MEDICINE

## 2025-02-28 PROCEDURE — 3008F BODY MASS INDEX DOCD: CPT | Performed by: FAMILY MEDICINE

## 2025-02-28 RX ORDER — TRAZODONE HYDROCHLORIDE 50 MG/1
50 TABLET ORAL NIGHTLY PRN
Qty: 30 TABLET | Refills: 5 | Status: SHIPPED | OUTPATIENT
Start: 2025-02-28 | End: 2026-02-28

## 2025-03-09 DIAGNOSIS — F43.21 GRIEF AT LOSS OF CHILD: ICD-10-CM

## 2025-03-09 DIAGNOSIS — Z63.4 GRIEF AT LOSS OF CHILD: ICD-10-CM

## 2025-03-09 SDOH — SOCIAL STABILITY - SOCIAL INSECURITY: DISSAPEARANCE AND DEATH OF FAMILY MEMBER: Z63.4

## 2025-03-10 RX ORDER — FLUOXETINE HYDROCHLORIDE 20 MG/1
20 CAPSULE ORAL DAILY
Qty: 30 CAPSULE | Refills: 5 | Status: SHIPPED | OUTPATIENT
Start: 2025-03-10

## 2025-04-09 ENCOUNTER — CLINICAL SUPPORT (OUTPATIENT)
Dept: PRIMARY CARE | Facility: CLINIC | Age: 45
End: 2025-04-09
Payer: COMMERCIAL

## 2025-04-09 DIAGNOSIS — R39.9 URINARY SYMPTOM OR SIGN: ICD-10-CM

## 2025-04-09 DIAGNOSIS — N39.0 URINARY TRACT INFECTION WITHOUT HEMATURIA, SITE UNSPECIFIED: Primary | ICD-10-CM

## 2025-04-09 LAB
POC APPEARANCE, URINE: CLEAR
POC BILIRUBIN, URINE: NEGATIVE
POC BLOOD, URINE: ABNORMAL
POC COLOR, URINE: YELLOW
POC GLUCOSE, URINE: NEGATIVE MG/DL
POC KETONES, URINE: ABNORMAL MG/DL
POC LEUKOCYTES, URINE: NEGATIVE
POC NITRITE,URINE: NEGATIVE
POC PH, URINE: 6.5 PH
POC PROTEIN, URINE: NEGATIVE MG/DL
POC SPECIFIC GRAVITY, URINE: 1.01
POC UROBILINOGEN, URINE: 0.2 EU/DL

## 2025-04-09 PROCEDURE — 81003 URINALYSIS AUTO W/O SCOPE: CPT | Performed by: FAMILY MEDICINE

## 2025-04-09 RX ORDER — NITROFURANTOIN 25; 75 MG/1; MG/1
100 CAPSULE ORAL 2 TIMES DAILY
Qty: 10 CAPSULE | Refills: 0 | Status: SHIPPED | OUTPATIENT
Start: 2025-04-09 | End: 2025-04-14

## 2025-04-09 NOTE — PROGRESS NOTES
Margarita Ortega is a 44 y.o. female who presents for Nurse Visit (UA/Cx- burning with urination, frequency, and incontinence)

## 2025-04-11 LAB — BACTERIA UR CULT: NORMAL

## 2025-06-12 ENCOUNTER — TELEPHONE (OUTPATIENT)
Dept: PRIMARY CARE | Facility: CLINIC | Age: 45
End: 2025-06-12
Payer: COMMERCIAL

## 2025-06-12 NOTE — TELEPHONE ENCOUNTER
Margarita has had a couple episodes of dizziness while at work. Both happened when she was moving boxes but they weren't very. The first time it happened she was shaky and thought maybe low blood sugar so she had the nurse at the [facility] department check and it was 112. Today it happened again and she had the nurse check her BP which was 132/90, which Margarita says is high for her. She did not notice and other symptoms like SOB, heart palpitations, or vision changes.

## 2025-06-12 NOTE — TELEPHONE ENCOUNTER
I left a detailed message for Margarita and offered her 6/26 at 11am. She will call back if that appt time doesn't work or if she has any questions.

## 2025-06-12 NOTE — TELEPHONE ENCOUNTER
Ok I am think we should have her come in within the next month to talk about it. I would work on water intake during the episodes. I would also like to continue to collected BP readings along with heart rate if possible.

## 2025-06-26 ENCOUNTER — APPOINTMENT (OUTPATIENT)
Dept: PRIMARY CARE | Facility: CLINIC | Age: 45
End: 2025-06-26
Payer: COMMERCIAL

## 2025-06-26 ENCOUNTER — HOSPITAL ENCOUNTER (OUTPATIENT)
Dept: CARDIOLOGY | Facility: HOSPITAL | Age: 45
Discharge: HOME | End: 2025-06-26
Payer: COMMERCIAL

## 2025-06-26 VITALS
WEIGHT: 169 LBS | SYSTOLIC BLOOD PRESSURE: 111 MMHG | HEIGHT: 65 IN | HEART RATE: 81 BPM | BODY MASS INDEX: 28.16 KG/M2 | DIASTOLIC BLOOD PRESSURE: 72 MMHG

## 2025-06-26 DIAGNOSIS — R55 NEAR SYNCOPE: ICD-10-CM

## 2025-06-26 DIAGNOSIS — R55 NEAR SYNCOPE: Primary | ICD-10-CM

## 2025-06-26 LAB
ATRIAL RATE: 67 BPM
P AXIS: 77 DEGREES
P OFFSET: 186 MS
P ONSET: 130 MS
PR INTERVAL: 180 MS
Q ONSET: 220 MS
QRS COUNT: 10 BEATS
QRS DURATION: 78 MS
QT INTERVAL: 402 MS
QTC CALCULATION(BAZETT): 424 MS
QTC FREDERICIA: 417 MS
R AXIS: 67 DEGREES
T AXIS: 62 DEGREES
T OFFSET: 421 MS
VENTRICULAR RATE: 67 BPM

## 2025-06-26 PROCEDURE — 99214 OFFICE O/P EST MOD 30 MIN: CPT | Performed by: FAMILY MEDICINE

## 2025-06-26 PROCEDURE — 1036F TOBACCO NON-USER: CPT | Performed by: FAMILY MEDICINE

## 2025-06-26 PROCEDURE — 3008F BODY MASS INDEX DOCD: CPT | Performed by: FAMILY MEDICINE

## 2025-06-26 PROCEDURE — 93005 ELECTROCARDIOGRAM TRACING: CPT

## 2025-06-26 NOTE — PROGRESS NOTES
"Subjective   Patient ID: Margarita Ortega is a 44 y.o. female who presents for Sick Visit (Dizziness x3 weeks; she did call a couple weeks ago and we had her record her bp's, still having dizziness, thinks her BP may be going too low, getting a little better since switching to half caf coffee; her health  had her add in gatorade zero to help with electrolytes to see if that helps the dizziness).    Dizziness: She has been having dizziness for about a month. Started at work after a busy day of carrying boxes, 30lb boxes. She was finishing up work and she was having some head spinning and hand shaking. First thought it was her blood sugar her her reading was good. Mostly at work lifting/carrying boxes. Checking blood sugar (normal, 112) and blood pressure which has been on the low side. She has lost almost 60 lbs since her last visit.     Insomnia: She is taking prozac, no SE's. Feels it is helping. Having blue days once a week but overall mood has been very manageable.      Back pain: Still with pain in the left side of her lower back off and on, gets it with standing, housework. Not radiating into the legs anymore. Completed xray. She is taking a supplement for arthritis. She is waking up with back pain, pain is worse in the morning.     Prediabetes: Not on meds. Weight has been stable.      She has 4 boys. Moved here 2y ago.      All other systems have been reviewed and are negative for complaint    Objective   Ht 1.651 m (5' 5\")   Wt 76.7 kg (169 lb)   LMP 05/11/2023   BMI 28.12 kg/m²     Gen: No acute distress, alert and oriented x3, pleasant   HEENT: moist mucous membranes, b/l external auditory canals are clear of debris, TMs within normal limits, no oropharyngeal lesions, eomi, perrla   Neck: thyroid within normal limits, no lymphadenopathy   CV: RRR, normal S1/S2, no murmur   Resp: Clear to auscultation bilaterally, no wheezes or rhonchi appreciated  Abd: soft, nontender, non-distended, no " guarding/rigidity, bowel sounds present  Extr: no edema, no calf tenderness  Derm: Skin is warm and dry, no rashes appreciated  Psych: mood is good, affect is congruent, good hygiene, normal speech and eye contact  Neuro: cranial nerves grossly intact, normal gait      Assessment/Plan     #Dizziness  Likely related to weight loss  Discussed fluid and electrolyte intake  Discussed caffeine use  Check labs and EKG    #Hot flashes  Recommending to touch base with gyn about HRT  Gyn recommended that sx likely related to stress level  They together opted to hold off on HRT     #Insomnia  #LUDMILA (supine only)  Doing well on trazodone     #Grief  Doing reasonably well on fluoxetine     #Back pain with radiculopathy  Xrays show DJD  Did ok with medrol pack, muscle relaxer  Declining PT at this point  Doing ok with prn ibuprofen     #Environmental allergies  on cetirizine  she gets nasacort otc  she takes flonase during winter but it doesn't help as much  she also uses eye drops (pataday)     #Prediabetes:  doing well since weight loss  check labs      HCM:  Mammogram overdue  Declining flu vaccine  No further pap due to hysterectomy but she does plan to continue with yearly pelvic

## 2025-06-27 LAB
ALBUMIN SERPL-MCNC: 4.5 G/DL (ref 3.6–5.1)
ALP SERPL-CCNC: 46 U/L (ref 31–125)
ALT SERPL-CCNC: 31 U/L (ref 6–29)
ANION GAP SERPL CALCULATED.4IONS-SCNC: 6 MMOL/L (CALC) (ref 7–17)
AST SERPL-CCNC: 22 U/L (ref 10–30)
BASOPHILS # BLD AUTO: 19 CELLS/UL (ref 0–200)
BASOPHILS NFR BLD AUTO: 0.4 %
BILIRUB SERPL-MCNC: 0.6 MG/DL (ref 0.2–1.2)
BUN SERPL-MCNC: 17 MG/DL (ref 7–25)
CALCIUM SERPL-MCNC: 9.3 MG/DL (ref 8.6–10.2)
CHLORIDE SERPL-SCNC: 103 MMOL/L (ref 98–110)
CO2 SERPL-SCNC: 28 MMOL/L (ref 20–32)
CREAT SERPL-MCNC: 0.57 MG/DL (ref 0.5–0.99)
EGFRCR SERPLBLD CKD-EPI 2021: 116 ML/MIN/1.73M2
EOSINOPHIL # BLD AUTO: 0 CELLS/UL (ref 15–500)
EOSINOPHIL NFR BLD AUTO: 0 %
ERYTHROCYTE [DISTWIDTH] IN BLOOD BY AUTOMATED COUNT: 14.1 % (ref 11–15)
FERRITIN SERPL-MCNC: 110 NG/ML (ref 16–232)
GLUCOSE SERPL-MCNC: 85 MG/DL (ref 65–139)
HCT VFR BLD AUTO: 40.9 % (ref 35–45)
HGB BLD-MCNC: 13 G/DL (ref 11.7–15.5)
IRON SERPL-MCNC: 44 MCG/DL (ref 40–190)
LYMPHOCYTES # BLD AUTO: 994 CELLS/UL (ref 850–3900)
LYMPHOCYTES NFR BLD AUTO: 20.7 %
MAGNESIUM SERPL-MCNC: 2.2 MG/DL (ref 1.5–2.5)
MCH RBC QN AUTO: 28.6 PG (ref 27–33)
MCHC RBC AUTO-ENTMCNC: 31.8 G/DL (ref 32–36)
MCV RBC AUTO: 90.1 FL (ref 80–100)
MONOCYTES # BLD AUTO: 346 CELLS/UL (ref 200–950)
MONOCYTES NFR BLD AUTO: 7.2 %
NEUTROPHILS # BLD AUTO: 3442 CELLS/UL (ref 1500–7800)
NEUTROPHILS NFR BLD AUTO: 71.7 %
PLATELET # BLD AUTO: 167 THOUSAND/UL (ref 140–400)
PMV BLD REES-ECKER: 10 FL (ref 7.5–12.5)
POTASSIUM SERPL-SCNC: 3.9 MMOL/L (ref 3.5–5.3)
PROT SERPL-MCNC: 6.6 G/DL (ref 6.1–8.1)
RBC # BLD AUTO: 4.54 MILLION/UL (ref 3.8–5.1)
SODIUM SERPL-SCNC: 137 MMOL/L (ref 135–146)
TSH SERPL-ACNC: 0.87 MIU/L
WBC # BLD AUTO: 4.8 THOUSAND/UL (ref 3.8–10.8)

## 2025-08-29 ENCOUNTER — APPOINTMENT (OUTPATIENT)
Dept: PRIMARY CARE | Facility: CLINIC | Age: 45
End: 2025-08-29
Payer: COMMERCIAL

## 2025-08-29 VITALS
HEART RATE: 83 BPM | HEIGHT: 65 IN | BODY MASS INDEX: 25.83 KG/M2 | WEIGHT: 155 LBS | SYSTOLIC BLOOD PRESSURE: 110 MMHG | DIASTOLIC BLOOD PRESSURE: 72 MMHG

## 2025-08-29 DIAGNOSIS — R73.09 ELEVATED GLUCOSE: Primary | ICD-10-CM

## 2025-08-29 DIAGNOSIS — Z13.220 SCREENING FOR HYPERLIPIDEMIA: ICD-10-CM

## 2025-08-29 PROCEDURE — 99214 OFFICE O/P EST MOD 30 MIN: CPT | Performed by: FAMILY MEDICINE

## 2025-08-29 PROCEDURE — 1036F TOBACCO NON-USER: CPT | Performed by: FAMILY MEDICINE

## 2025-08-29 PROCEDURE — 3008F BODY MASS INDEX DOCD: CPT | Performed by: FAMILY MEDICINE

## 2025-09-03 DIAGNOSIS — F51.01 PRIMARY INSOMNIA: ICD-10-CM

## 2025-09-03 DIAGNOSIS — F43.21 GRIEF AT LOSS OF CHILD: ICD-10-CM

## 2025-09-03 DIAGNOSIS — Z63.4 GRIEF AT LOSS OF CHILD: ICD-10-CM

## 2025-09-03 RX ORDER — FLUOXETINE 20 MG/1
20 CAPSULE ORAL DAILY
Qty: 30 CAPSULE | Refills: 5 | Status: SHIPPED | OUTPATIENT
Start: 2025-09-03

## 2025-09-03 RX ORDER — TRAZODONE HYDROCHLORIDE 50 MG/1
50 TABLET ORAL NIGHTLY PRN
Qty: 30 TABLET | Refills: 5 | Status: SHIPPED | OUTPATIENT
Start: 2025-09-03

## 2025-09-03 SDOH — SOCIAL STABILITY - SOCIAL INSECURITY: DISSAPEARANCE AND DEATH OF FAMILY MEMBER: Z63.4

## 2025-09-09 ENCOUNTER — APPOINTMENT (OUTPATIENT)
Dept: RADIOLOGY | Facility: HOSPITAL | Age: 45
End: 2025-09-09
Payer: COMMERCIAL

## 2026-02-20 ENCOUNTER — APPOINTMENT (OUTPATIENT)
Dept: PRIMARY CARE | Facility: CLINIC | Age: 46
End: 2026-02-20
Payer: COMMERCIAL

## (undated) DEVICE — TUBING, ARTHROSCOPIC INFLOW, 10K

## (undated) DEVICE — GLOVE, SURGICAL, PROTEXIS PI BLUE W/NEUTHERA, 8.5, PF, LF

## (undated) DEVICE — TRAY, DRY PREP, PREMIUM

## (undated) DEVICE — SOLUTION, IRRIGATION, SODIUM CHLORIDE 0.9%, 1000 ML, POUR BOTTLE

## (undated) DEVICE — OINTMENT, TOPICAL, BACITRACIN

## (undated) DEVICE — GLOVE, SURGICAL, PROTEXIS PI ORTHO, 8.5, PF, LF

## (undated) DEVICE — SOLUTION, INJECTION, USP, SODIUM CHLORIDE 0.9%, .9, NACL, 1000 ML, BAG

## (undated) DEVICE — PADDING, UNDERCAST, WEBRIL II, 6 IN X 4 YD, CRIMP, NS

## (undated) DEVICE — DRESSING, NON-ADHERENT, 3 X 3 IN, STERILE

## (undated) DEVICE — CUFF, TOURNIQUET, 30 X 4, DUAL PORT/SNGL BLADDER, DISP, LF

## (undated) DEVICE — Device

## (undated) DEVICE — SOLUTION, IRRIGATION, STERILE WATER, 1000 ML, POUR BOTTLE

## (undated) DEVICE — GOWN, ASTOUND, XL

## (undated) DEVICE — PAD, GROUNDING, ELECTROSURGICAL, PATIENT PLATE, W/O CORD, ADULT LARGE

## (undated) DEVICE — BANDAGE, COMPRESSION, W/CLIP, FLEX-MASTER, DOUBLE LENGTH, 6 IN X 11 YD, LF

## (undated) DEVICE — GLOVE, SURGICAL, PROTEXIS PI , 8.5, PF, LF

## (undated) DEVICE — DRESSING, NON-ADHERENT, OIL EMULSION, CURITY, 3 X 8 IN, STERILE

## (undated) DEVICE — PADDING, UNDERCAST, WEBRIL, 6 IN X 4 YD, REG, NS

## (undated) DEVICE — BANDAGE, GAUZE, CONFORMING, KERLIX, 6 PLY, 4.5 IN X 4.1 YD

## (undated) DEVICE — SUTURE, PROLENE, 3-0, 18 IN, PS2, BLUE

## (undated) DEVICE — SUTURE, VICRYL 3-0, PRECISION POINT, PS-2 UNDYED 27 INCH

## (undated) DEVICE — BANDAGE, ESMARK 4 IN X 9 FT, STERILE

## (undated) DEVICE — SUTURE, PROLENE, 2-0, 30 IN, FSLX, BLUE

## (undated) DEVICE — BUR, SOLID ROUND, CARBIDE, LONG, 4.0MM

## (undated) DEVICE — CAUTERY, PENCIL, E-SEP, SMOKE EVAC, 70MM

## (undated) DEVICE — SYRINGE, 20 CC, LUER LOCK